# Patient Record
Sex: FEMALE | Race: WHITE | Employment: FULL TIME | URBAN - METROPOLITAN AREA
[De-identification: names, ages, dates, MRNs, and addresses within clinical notes are randomized per-mention and may not be internally consistent; named-entity substitution may affect disease eponyms.]

---

## 2019-08-05 ENCOUNTER — OFFICE VISIT (OUTPATIENT)
Dept: ORTHOPEDIC SURGERY | Age: 59
End: 2019-08-05

## 2019-08-05 VITALS
HEART RATE: 94 BPM | OXYGEN SATURATION: 96 % | DIASTOLIC BLOOD PRESSURE: 77 MMHG | SYSTOLIC BLOOD PRESSURE: 133 MMHG | WEIGHT: 244.6 LBS | HEIGHT: 71 IN | BODY MASS INDEX: 34.24 KG/M2 | TEMPERATURE: 98.7 F | RESPIRATION RATE: 12 BRPM

## 2019-08-05 DIAGNOSIS — M47.812 CERVICAL FACET JOINT SYNDROME: ICD-10-CM

## 2019-08-05 DIAGNOSIS — M54.12 RIGHT CERVICAL RADICULOPATHY: ICD-10-CM

## 2019-08-05 DIAGNOSIS — R29.898 RIGHT ARM WEAKNESS: ICD-10-CM

## 2019-08-05 DIAGNOSIS — M50.20 HNP (HERNIATED NUCLEUS PULPOSUS), CERVICAL: Primary | ICD-10-CM

## 2019-08-05 DIAGNOSIS — M62.838 MUSCLE SPASM: ICD-10-CM

## 2019-08-05 DIAGNOSIS — M48.02 CERVICAL SPINAL STENOSIS: ICD-10-CM

## 2019-08-05 RX ORDER — ACETAMINOPHEN 500 MG
TABLET ORAL
COMMUNITY

## 2019-08-05 RX ORDER — PREDNISONE 10 MG/1
TABLET ORAL
Qty: 32 TAB | Refills: 0 | Status: SHIPPED | OUTPATIENT
Start: 2019-08-05 | End: 2019-08-21

## 2019-08-05 RX ORDER — METHYLPREDNISOLONE 4 MG/1
TABLET ORAL
COMMUNITY
Start: 2019-07-16 | End: 2019-08-05 | Stop reason: ALTCHOICE

## 2019-08-05 RX ORDER — LORATADINE 10 MG/1
1 TABLET ORAL DAILY
COMMUNITY
Start: 2019-06-11

## 2019-08-05 RX ORDER — DICLOFENAC SODIUM 10 MG/G
4 GEL TOPICAL 4 TIMES DAILY
COMMUNITY
Start: 2019-06-11 | End: 2019-08-13

## 2019-08-05 RX ORDER — METRONIDAZOLE 10 MG/G
GEL TOPICAL DAILY
COMMUNITY

## 2019-08-05 RX ORDER — KETOROLAC TROMETHAMINE 15 MG/ML
60 INJECTION, SOLUTION INTRAMUSCULAR; INTRAVENOUS ONCE
Qty: 1 VIAL | Refills: 0
Start: 2019-08-05 | End: 2019-08-05

## 2019-08-05 RX ORDER — GABAPENTIN 100 MG/1
1 CAPSULE ORAL
COMMUNITY
Start: 2019-07-20 | End: 2019-08-05 | Stop reason: SDUPTHER

## 2019-08-05 RX ORDER — GABAPENTIN 300 MG/1
CAPSULE ORAL
Qty: 90 CAP | Refills: 1 | Status: SHIPPED | OUTPATIENT
Start: 2019-08-05 | End: 2019-10-04 | Stop reason: SDUPTHER

## 2019-08-05 RX ORDER — LISINOPRIL 10 MG/1
1 TABLET ORAL DAILY
COMMUNITY
Start: 2019-06-11

## 2019-08-05 RX ORDER — METHOCARBAMOL 500 MG/1
1 TABLET, FILM COATED ORAL
COMMUNITY
Start: 2019-07-16 | End: 2019-08-05 | Stop reason: SDUPTHER

## 2019-08-05 RX ORDER — ERGOCALCIFEROL 1.25 MG/1
1 CAPSULE ORAL
COMMUNITY
Start: 2019-06-29

## 2019-08-05 RX ORDER — METHOCARBAMOL 500 MG/1
TABLET, FILM COATED ORAL
Qty: 90 TAB | Refills: 3 | Status: SHIPPED | OUTPATIENT
Start: 2019-08-05 | End: 2020-02-18 | Stop reason: SDUPTHER

## 2019-08-05 NOTE — PATIENT INSTRUCTIONS
Neck Arthritis: Exercises Introduction Here are some examples of exercises for you to try. The exercises may be suggested for a condition or for rehabilitation. Start each exercise slowly. Ease off the exercises if you start to have pain. You will be told when to start these exercises and which ones will work best for you. How to do the exercises Neck stretches to the side 1. This stretch works best if you keep your shoulder down as you lean away from it. To help you remember to do this, start by relaxing your shoulders and lightly holding on to your thighs or your chair. 2. Tilt your head toward your shoulder and hold for 15 to 30 seconds. Let the weight of your head stretch your muscles. 3. Repeat 2 to 4 times toward each shoulder. Chin tuck 1. Lie on the floor with a rolled-up towel under your neck. Your head should be touching the floor. 2. Slowly bring your chin toward your chest. 
3. Hold for a count of 6, and then relax for up to 10 seconds. 4. Repeat 8 to 12 times. Active cervical rotation 1. Sit in a firm chair, or stand up straight. 2. Keeping your chin level, turn your head to the right, and hold for 15 to 30 seconds. 3. Turn your head to the left and hold for 15 to 30 seconds. 4. Repeat 2 to 4 times to each side. Shoulder blade squeeze 1. While standing, squeeze your shoulder blades together. 2. Do not raise your shoulders up as you are squeezing. 3. Hold for 6 seconds. 4. Repeat 8 to 12 times. Shoulder rolls 1. Sit comfortably with your feet shoulder-width apart. You can also do this exercise standing up. 2. Roll your shoulders up, then back, and then down in a smooth, circular motion. 3. Repeat 2 to 4 times. Follow-up care is a key part of your treatment and safety. Be sure to make and go to all appointments, and call your doctor if you are having problems. It's also a good idea to know your test results and keep a list of the medicines you take. Where can you learn more? Go to http://rachel-omid.info/. Enter Q379 in the search box to learn more about \"Neck Arthritis: Exercises. \" Current as of: September 20, 2018 Content Version: 12.1 © 5043-3427 [employer]. Care instructions adapted under license by [employer] (which disclaims liability or warranty for this information). If you have questions about a medical condition or this instruction, always ask your healthcare professional. [place] [address] any warranty or liability for your use of this information.

## 2019-08-05 NOTE — PROGRESS NOTES
[facility]  [address] [place] [address], [facility] [address]  Phone: [phone]  Fax: [phone]    NEW PATIENT  Pt's YOB: 1960    ASSESSMENT   Diagnoses and all orders for this visit:    1. HNP (herniated nucleus pulposus), cervical  -     REFERRAL TO SPINE SURGERY  -     AMB POC XRAY, SPINE, CERVICAL; 4+ VIE  -     predniSONE (DELTASONE) 10 mg tablet; 6 pills Day 1, 5 pills Day 2, 4 pills Day 3&4, 3 pills Day 5&6, 2 pills Day 7&8, 1 pill Day 9&10, 1/2 pill Day 11&12    2. Right arm weakness  -     REFERRAL TO SPINE SURGERY  -     AMB POC XRAY, SPINE, CERVICAL; 4+ VIE  -     predniSONE (DELTASONE) 10 mg tablet; 6 pills Day 1, 5 pills Day 2, 4 pills Day 3&4, 3 pills Day 5&6, 2 pills Day 7&8, 1 pill Day 9&10, 1/2 pill Day 11&12    3. Right cervical radiculopathy  -     gabapentin (NEURONTIN) 300 mg capsule; Take 1 cap by mouth in the morning and 2 at night as directed. -     REFERRAL TO SPINE SURGERY  -     AMB POC XRAY, SPINE, CERVICAL; 4+ VIE  -     predniSONE (DELTASONE) 10 mg tablet; 6 pills Day 1, 5 pills Day 2, 4 pills Day 3&4, 3 pills Day 5&6, 2 pills Day 7&8, 1 pill Day 9&10, 1/2 pill Day 11&12    4. Cervical facet joint syndrome  -     REFERRAL TO SPINE SURGERY  -     AMB POC XRAY, SPINE, CERVICAL; 4+ VIE  -     predniSONE (DELTASONE) 10 mg tablet; 6 pills Day 1, 5 pills Day 2, 4 pills Day 3&4, 3 pills Day 5&6, 2 pills Day 7&8, 1 pill Day 9&10, 1/2 pill Day 11&12    5. Muscle spasm  -     methocarbamol (ROBAXIN) 500 mg tablet; Take 1 tab by mouth BID-TID as needed for muscle spasms  Indications: muscle spasm  -     REFERRAL TO SPINE SURGERY  -     AMB POC XRAY, SPINE, CERVICAL; 4+ VIE    6. Cervical spinal stenosis  -     REFERRAL TO SPINE SURGERY         IMPRESSION AND PLAN:  Que Pereyra is a 63 y.o. right hand dominant female with history of neck pain since 03/2019.  She complains of pain in the neck that radiates down the right arm to the wrist. Pt also reports weakness in the right arm and admits to dropping objects. She is attending physical therapy with traction, takes Robaxin 500 mg 1-3 tabs a day with relief, but notes minimal improvement with Neurontin 100 mg 1 tab QHS. 1) Pt was given information on cervical arthritis exercises. 2) She received a refill of Robaxin 500 mg 1 tab BID-TID prn muscle spasm  3) Pt will increase her Neurontin 100 mg to 300 mg 1 tab QAM and 2 tabs QHS tapering up as directed. 4) She was referred to Dr. Randall Corrigan for surgical evaluation. 5) Ms. Pat Newton has a reminder for a \"due or due soon\" health maintenance. I have asked that she contact her primary care provider, Nazia Rolle MD, for follow-up on this health maintenance. 6)  demonstrated consistency with prescribing. 7) Pt offered Toradol injection for pain and initially accepted -- she then declined stating that her PCP told her to avoid NSAIDs after viewing recent labs (none, however, are available for review)  Follow-up and Dispositions    · Return in about 2 weeks (around 8/19/2019), or surgical evaluation with Dr Randall Corrigan, for Medication follow up. HISTORY OF PRESENT ILLNESS:  Miky Michael is a 63 y.o. right hand dominant female with history of neck pain since 03/2019. Pt presents to the office today as a new patient. She complains of pain in the neck that radiates down the right arm to the wrist. Pt reports that her pain originally started as a twinge in the right biceps in 03/2019 and then progressively worsened. Pt reports a history of similar symptoms 4 years ago but this improved with physical therapy. She takes Robaxin 500 mg intermittently as needed and notes that her symptoms used to improve within 1-2 days of taking the medication. Pt states that her symptoms are now constant. She notes weakness in the right arm and admits to dropping objects. Pt denies any left sided symptoms at this time.  She reports that her numbness/tingling in the right arm has improved since attending physical therapy with traction. Pt is currently enrolled in physical therapy. Pt states she continued to work and sits at the computer for about half of her work day. Pt states she has been using Robaxin 500 mg 1-3 tabs a day with relief. She also takes Neurontin 100 mg 1 tab QHS without improvement. Pt recently took prednisone as prescribed through [facility]. Pt at this time desires to proceed with medication evaluation and was referred to Dr. Merly Koehler for surgical evaluation. Of note, she is a smoker.      Pain Scale: 4/10     PCP: Kong Ochoa MD    Past Medical History:   Diagnosis Date    Hypertension         Social History     Socioeconomic History    Marital status:      Spouse name: Not on file    Number of children: Not on file    Years of education: Not on file    Highest education level: Not on file   Occupational History    Occupation: [employer]     Employer: OTHER     Comment: at [address] [facility] [employer] resource strain: Not on file    Food insecurity:     Worry: Not on file     Inability: Not on file    Transportation needs:     Medical: Not on file     Non-medical: Not on file   Tobacco Use    Smoking status: Light Tobacco Smoker     Packs/day: 0.25    Smokeless tobacco: Never Used   Substance and Sexual Activity    Alcohol use: Not Currently    Drug use: Not on file    Sexual activity: Not on file   Lifestyle    Physical activity:     Days per week: Not on file     Minutes per session: Not on file    Stress: Not on file   Relationships    Social connections:     Talks on phone: Not on file     Gets together: Not on file     Attends Christian service: Not on file     Active member of club or organization: Not on file     Attends meetings of clubs or organizations: Not on file     Relationship status: Not on file    Intimate partner violence:     Fear of current or ex partner: Not on file     Emotionally abused: Not on file     Physically abused: Not on file     Forced sexual activity: Not on file   Other Topics Concern     Service Not Asked    Blood Transfusions Not Asked    Caffeine Concern Not Asked    Occupational Exposure Not Asked    Hobby Hazards Not Asked    Sleep Concern Not Asked    Stress Concern Not Asked    Weight Concern Not Asked    Special Diet Not Asked    Back Care Not Asked    Exercise Not Asked    Bike Helmet Not Asked   [address] Not Asked    Self-Exams Not Asked   Social History Narrative    Not on file           Allergies   Allergen Reactions    Codeine Anaphylaxis    Cranberry Anaphylaxis    Morphine Anaphylaxis    Nsaids (Non-Steroidal Anti-Inflammatory Drug) Other (comments)     Gastric Bypass Surgery in the Past    Strawberry Anaphylaxis       REVIEW OF SYSTEMS    Constitutional: Negative for fever, chills, or weight change. Respiratory: Negative for cough or shortness of breath. Cardiovascular: Negative for chest pain or palpitations. Gastrointestinal: Negative for acid reflux, change in bowel habits, or constipation. Genitourinary: Negative for dysuria and flank pain. Musculoskeletal: Positive for cervical and right arm pain. Skin: Negative for rash. Neurological: Negative for headaches, dizziness, or numbness. Endo/Heme/Allergies: Negative for increased bruising. Psychiatric/Behavioral: Negative for difficulty with sleep. PHYSICAL EXAMINATION  Visit Vitals  /77   Pulse 94   Temp 98.7 °F (37.1 °C) (Oral)   Resp 12   Ht 5' 11\" (1.803 m)   Wt 244 lb 9.6 oz (110.9 kg)   SpO2 96%   BMI 34.11 kg/m²       Constitutional: Awake, alert, and in no acute distress. HEENT: Normocephalic. Atraumatic. Oropharynx is moist and clear. PERRL. EOMI. Sclerae are nonicteric  Cardiovascular: Regular rate and rhythm  Lungs: Clear to auscultation bilaterally  Abdomen: Soft and nontender.  Bowel sounds are present  Neurological: 1+ symmetrical DTRs in the upper extremities. 1+ symmetrical DTRs in the lower extremities. Sensation to light touch is intact. Negative Nixon's sign bilaterally. Skin: warm, dry, and intact. Musculoskeletal: Decreased range of motion with side to side cervical flexion. Negative Melani's test bilaterally. Positive Bakody's sign. No pain with extension, axial loading, or forward flexion. No pain with internal or external rotation of her hips. Negative straight leg raise bilaterally. Pt had her right arm elevated on a pillow throughout her office visit today. Biceps  Triceps Deltoids Wrist Ext Wrist Flex Hand Intrin   Right -4/5 -4/5 -4/5 -4/5 -4/5 -4/5   Left +4/5 +4/5 +4/5 +4/5 +4/5 +4/5      Hip Flex  Quads Hamstrings Ankle DF EHL Ankle PF   Right +4/5 +4/5 +4/5 +4/5 +4/5 +4/5   Left +4/5 +4/5 +4/5 +4/5 +4/5 +4/5     IMAGING:    Cervical spine x-rays from 08/05/2019 were personally reviewed with the patient and demonstrated:  Reversal of cervical curvature. Degenerative disc at C3, C4, C5, C6, and C7. Degenerative facets. Cervical spine MRI from 6/24/2019 was personally reviewed with the patient and demonstrated:  IMPRESSION:  Multilevel degenerative changes of the cervical spine as described. Recommended correlation with site and side of patients symptoms to determine significance of imaging findings    Cervical spine MRI from 6/24/2015 was personally reviewed with the patient and demonstrated:  IMPRESSION:  An overall moderate multilevel, multifactorial degenerative changes of the cervical spine with ventral cervical spinal cord contact multiple levels with severe foraminal narrowing on the right at C5/6. Written by Gunjan Mijares, as dictated by Marv Gallo MD.  I, Dr. Marv Gallo confirm that all documentation is accurate.

## 2019-08-05 NOTE — Clinical Note
8/5/19 Patient: Mari Parikh YOB: 1960 Date of Visit: 8/5/2019 [address] [place] [address] V[address] Dear Marbella Arthur MD, Thank you for referring Ms. Mari Parikh to [facility] for evaluation. My notes for this consultation are attached. If you have questions, please do not hesitate to call me. I look forward to following your patient along with you. Sincerely, Dary Reid MD

## 2019-08-07 ENCOUNTER — OFFICE VISIT (OUTPATIENT)
Dept: ORTHOPEDIC SURGERY | Age: 59
End: 2019-08-07

## 2019-08-07 VITALS
OXYGEN SATURATION: 93 % | RESPIRATION RATE: 18 BRPM | WEIGHT: 245 LBS | DIASTOLIC BLOOD PRESSURE: 88 MMHG | SYSTOLIC BLOOD PRESSURE: 133 MMHG | HEART RATE: 98 BPM | HEIGHT: 71 IN | TEMPERATURE: 98.6 F | BODY MASS INDEX: 34.3 KG/M2

## 2019-08-07 DIAGNOSIS — M50.20 HNP (HERNIATED NUCLEUS PULPOSUS), CERVICAL: Primary | ICD-10-CM

## 2019-08-07 NOTE — PROGRESS NOTES
[address] [facility]   Pre-Surgical Worksheet    Patient: Delbert Gunn                         MRN: [id number]     Age:  63 y.o.,      Sex: female    YOB: 1960           JUANA: August 7, 2019  PCP: Haley Mcghee MD    Allergies   Allergen Reactions    Codeine Anaphylaxis    Cranberry Anaphylaxis    Morphine Anaphylaxis    Nsaids (Non-Steroidal Anti-Inflammatory Drug) Other (comments)     Gastric Bypass Surgery in the Past    Strawberry Anaphylaxis         ICD-10-CM ICD-9-CM    1. HNP (herniated nucleus pulposus), cervical M50.20 722.0        Surgery: ACDF C5/6. Pain Assessment   Pain Assessment  8/7/2019   Location of Pain Arm;Neck   Location Modifiers Right   Severity of Pain 4   Quality of Pain Throbbing;Aching   Duration of Pain Persistent   Frequency of Pain Constant   Aggravating Factors Bending;Stretching;Straightening   Limiting Behavior Some   Relieving Factors Elevation   Result of Injury No       Visit Vitals  /88   Pulse 98   Temp 98.6 °F (37 °C)   Resp 18   Ht 5' 11\" (1.803 m)   Wt 245 lb (111.1 kg)   SpO2 93%   BMI 34.17 kg/m²       ADL Limits: Patient carries pillow around to prop her right arm due to the pain. She has to have help bathing, and getting dressed due to the inability to lift her right arm without causing excruciating pain    Spine Surgery?: No:  When ? [facility] Where? .    Spinal Injections?: No:   When ? [facility] Where? .    Physical Therapy?: Yes  When 2019. Where [address] [place] [address]. NSAID's?: NO    Pain Medications?: No:   Type: ? [facility] In Pain Management: NO, Where: ?    Current Outpatient Medications   Medication Sig    VOLTAREN 1 % gel Apply 4 g to affected area four (4) times daily.  ergocalciferol (ERGOCALCIFEROL) 50,000 unit capsule Take 1 Cap by mouth every seven (7) days.  lisinopril (PRINIVIL, ZESTRIL) 10 mg tablet Take 1 Tab by mouth daily.  loratadine (CLARITIN) 10 mg tablet Take 1 Tab by mouth daily.     metroNIDAZOLE (METROGEL) 1 % topical gel Apply  to affected area daily. Use a thin layer to affected areas after washing    acetaminophen (TYLENOL EXTRA STRENGTH) 500 mg tablet Take  by mouth every six (6) hours as needed for Pain.  methocarbamol (ROBAXIN) 500 mg tablet Take 1 tab by mouth BID-TID as needed for muscle spasms  Indications: muscle spasm    gabapentin (NEURONTIN) 300 mg capsule Take 1 cap by mouth in the morning and 2 at night as directed.  predniSONE (DELTASONE) 10 mg tablet 6 pills Day 1, 5 pills Day 2, 4 pills Day 3&4, 3 pills Day 5&6, 2 pills Day 7&8, 1 pill Day 9&10, 1/2 pill Day 11&12     No current facility-administered medications for this visit. Past Medical History:   Diagnosis Date    Hypertension        Past Surgical History:   Procedure Laterality Date    HX BREAST REDUCTION Bilateral     HX  SECTION      HX  SECTION      HX CHOLECYSTECTOMY      HX GASTRIC BYPASS      HX GYN  1993    uterus and cervix removed    HX OOPHORECTOMY      HX ORTHOPAEDIC Right 2000    \"wrist surgically fixed\" per patient    [address]    \"Most of Thyroid removed\" per patient.     HX TONSILLECTOMY  1965       Social History     Socioeconomic History    Marital status:      Spouse name: Not on file    Number of children: Not on file    Years of education: Not on file    Highest education level: Not on file   Occupational History    Occupation: [employer]     Employer: OTHER     Comment: at [address] [facility], [address] Use    Smoking status: Light Tobacco Smoker     Packs/day: 0.25    Smokeless tobacco: Never Used   Substance and Sexual Activity    Alcohol use: Not Currently   Other Topics Concern

## 2019-08-07 NOTE — PROGRESS NOTES
[address]  [address], [address]  [address]  Phone: [phone]  Fax: [phone]  INITIAL CONSULTATION  Patient: Gabby Michelle                MRN: [id number]       SSN: [id number]  YOB: 1960        AGE: 63 y.o. SEX: female  Body mass index is 34.17 kg/m². PCP: Jeanmarie Licona MD  08/07/19    Chief Complaint   Patient presents with    Neck Pain     neck pain    Arm Pain     right arm pain         HISTORY OF PRESENT ILLNESS, RADIOGRAPHS, and PLAN:         HISTORY OF PRESENT ILLNESS:  Ms. Lyn Latif is seen today at the request of Dr. Slater Schaumann and Dr. Williamson. Ms. Lyn Latif is a 71-year-old female who works in [employer]. She has a history of hypertension and vitamin D deficiency. About five or six months ago, she had an onset of severe neck and radiating right arm pain in a classic C6 distribution in her neck, biceps, and to her thumb. It steadily worsens despite physical therapy, medications, and time. She has to hold her arm with a pillow, and she is constantly posturing with it. She has a hard time working or functioning. She feels weak and numb in the arm. She denies bowel or bladder dysfunction, fever, chills, or night sweats, or gait instability. RADIOGRAPHS:  X-rays demonstrate diffuse degenerative changes and loss of cervical lordosis. MRI of the cervical spine demonstrates diffuse degenerative changes, but at C5-6 there is a right paracentral to foraminal extrusion causing severe foraminal lateral recess stenosis. ASSESSMENT/PLAN: I discussed the matter at length with her. Given the severity of her pain and neurologic deficit, its duration, and failure of conservative measures, my recommendation would be a cervical decompression and fusion at C5-6. I considered cervical disc arthroplasty, but the degenerative changes in the segment are just simply too great.   There is not enough disc space height to tolerate a disc replacement. I discussed the risks, benefits, complications, and alternatives to surgery. The patient wishes to proceed. We will proceed with a cervical discectomy and fusion was the appropriate approvals and clearances take place. cc: Ganga Cormier M.D. Past Medical History:   Diagnosis Date    Hypertension        Family History   Problem Relation Age of Onset    Osteoporosis Mother     Heart Disease Mother     Arrhythmia Mother     Hypertension Mother     Thyroid Disease Father     Stroke Father     Other Father         Rheumatic Fever    Hypertension Father        Current Outpatient Medications   Medication Sig Dispense Refill    VOLTAREN 1 % gel Apply 4 g to affected area four (4) times daily.  ergocalciferol (ERGOCALCIFEROL) 50,000 unit capsule Take 1 Cap by mouth every seven (7) days.  lisinopril (PRINIVIL, ZESTRIL) 10 mg tablet Take 1 Tab by mouth daily.  loratadine (CLARITIN) 10 mg tablet Take 1 Tab by mouth daily.  metroNIDAZOLE (METROGEL) 1 % topical gel Apply  to affected area daily. Use a thin layer to affected areas after washing      acetaminophen (TYLENOL EXTRA STRENGTH) 500 mg tablet Take  by mouth every six (6) hours as needed for Pain.  methocarbamol (ROBAXIN) 500 mg tablet Take 1 tab by mouth BID-TID as needed for muscle spasms  Indications: muscle spasm 90 Tab 3    gabapentin (NEURONTIN) 300 mg capsule Take 1 cap by mouth in the morning and 2 at night as directed.  90 Cap 1    predniSONE (DELTASONE) 10 mg tablet 6 pills Day 1, 5 pills Day 2, 4 pills Day 3&4, 3 pills Day 5&6, 2 pills Day 7&8, 1 pill Day 9&10, 1/2 pill Day 11&12 32 Tab 0       Allergies   Allergen Reactions    Codeine Anaphylaxis    Cranberry Anaphylaxis    Morphine Anaphylaxis    Nsaids (Non-Steroidal Anti-Inflammatory Drug) Other (comments)     Gastric Bypass Surgery in the Past    Strawberry Anaphylaxis       Past Surgical History:   Procedure Laterality Date    HX BREAST REDUCTION Bilateral 1994    HX  SECTION  1989    HX  SECTION  1993    HX CHOLECYSTECTOMY  1991    HX GASTRIC BYPASS  2002    HX GYN  1993    uterus and cervix removed    HX OOPHORECTOMY  2006    HX ORTHOPAEDIC Right 2000    \"wrist surgically fixed\" per patient    [address]    \"Most of Thyroid removed\" per patient.     HX TONSILLECTOMY  1965       Past Medical History:   Diagnosis Date    Hypertension        Social History     Socioeconomic History    Marital status:      Spouse name: Not on file    Number of children: Not on file    Years of education: Not on file    Highest education level: Not on file   Occupational History    Occupation: [employer]     Employer: OTHER     Comment: at [address] [facility] resource strain: Not on file    Food insecurity:     Worry: Not on file     Inability: Not on file    Transportation needs:     Medical: Not on file     Non-medical: Not on file   Tobacco Use    Smoking status: Light Tobacco Smoker     Packs/day: 0.25    Smokeless tobacco: Never Used   Substance and Sexual Activity    Alcohol use: Not Currently    Drug use: Not on file    Sexual activity: Not on file   Lifestyle    Physical activity:     Days per week: Not on file     Minutes per session: Not on file    Stress: Not on file   Relationships    Social connections:     Talks on phone: Not on file     Gets together: Not on file     Attends Pent[employer]al service: Not on file     Active member of club or organization: Not on file     Attends meetings of clubs or organizations: Not on file     Relationship status: Not on file    Intimate partner violence:     Fear of current or ex partner: Not on file     Emotionally abused: Not on file     Physically abused: Not on file     Forced sexual activity: Not on file   Other Topics Concern   [address] Service Not Asked    Blood Transfusions Not Asked    Caffeine Concern Not Asked    Occupational Exposure Not Asked   [place] Hazards Not Asked    Sleep Concern Not Asked    Stress Concern Not Asked    Weight Concern Not Asked    Special Diet Not Asked    Back Care Not Asked    Exercise Not Asked    Bike Helmet Not Asked   [address] Not Asked    Self-Exams Not Asked   Social History Narrative    Not on file           REVIEW OF SYSTEMS:   CONSTITUTIONAL SYMPTOMS:  Negative. EYES:  Negative. EARS, NOSE, THROAT AND MOUTH:  Negative. CARDIOVASCULAR:  Negative. RESPIRATORY:  Negative. GENITOURINARY: Per HPI. GASTROINTESTINAL:  Per HPI. INTEGUMENTARY (SKIN AND/OR BREAST):  Negative. MUSCULOSKELETAL: Per HPI.   ENDOCRINE/RHEUMATOLOGIC:  Negative. NEUROLOGICAL:  Per HPI. HEMATOLOGIC/LYMPHATIC:  Negative. ALLERGIC/IMMUNOLOGIC:  Negative. PSYCHIATRIC:  Negative. PHYSICAL EXAMINATION:   Visit Vitals  /88   Pulse 98   Temp 98.6 °F (37 °C)   Resp 18   Ht 5' 11\" (1.803 m)   Wt 245 lb (111.1 kg)   SpO2 93%   BMI 34.17 kg/m²    PAIN SCALE: 4/10    CONSTITUTIONAL: The patient is in no apparent distress and is alert and oriented x 3. HEENT: Normocephalic. Hearing grossly intact. NECK: Supple and symmetric. no tenderness, or masses were felt. RESPIRATORY: No labored breathing. CARDIOVASCULAR: The carotid pulses were normal. Peripheral pulses were 2+. CHEST: Normal AP diameter and normal contour without any kyphoscoliosis. LYMPHATIC: No lymphadenopathy was appreciated in the neck, axillae or groin. SKIN:  Negative for scars, rashes, lesions, or ulcers on the right upper, right lower, left upper, left lower and trunk. NEUROLOGICAL: Alert and oriented x 3. Ambulation without assistive device. FWB. EXTREMITIES:  See musculoskeletal.  MUSCULOSKELETAL:   Head and Neck: Neck pain radiating into RUE. Negative for misalignment, asymmetry, crepitation, defects, tenderness masses or effusions.     Left Upper Extremity: Inspection, percussion and palpation performed. Nixons sign is negative.  Right Upper Extremity: Pain. Inspection, percussion and palpation performed. Nixons sign is negative.  Spine, Ribs and Pelvis: Inspection, percussion and palpation performed. Negative for misalignment, asymmetry, crepitation, defects, tenderness masses or effusions.  Left Lower Extremity: Inspection, percussion and palpation performed. Negative straight leg raise.  Right Lower Extremity: Inspection, percussion and palpation performed. Negative straight leg raise. SPINE EXAM:     Cervical spine: Neck is midline. Normal muscle tone. No focal atrophy is noted. Lumbar spine: No rash, ecchymosis, or gross obliquity. No focal atrophy is noted. ASSESSMENT    ICD-10-CM ICD-9-CM    1. HNP (herniated nucleus pulposus), cervical M50.20 722.0        Written by Anna Taveras, as dictated by Frances Magaña MD.    I, Dr. Frances Magaña MD, confirm that all documentation is accurate.

## 2019-08-08 ENCOUNTER — TELEPHONE (OUTPATIENT)
Dept: ORTHOPEDIC SURGERY | Age: 59
End: 2019-08-08

## 2019-08-08 DIAGNOSIS — Z01.818 PRE-OP EVALUATION: Primary | ICD-10-CM

## 2019-08-09 ENCOUNTER — HOSPITAL ENCOUNTER (OUTPATIENT)
Dept: PREADMISSION TESTING | Age: 59
Discharge: HOME | End: 2019-08-09
Payer: OTHER GOVERNMENT

## 2019-08-09 DIAGNOSIS — Z01.818 PRE-OP EVALUATION: ICD-10-CM

## 2019-08-09 LAB
ALBUMIN SERPL-MCNC: 3.5 G/DL (ref 3.4–5)
ALBUMIN/GLOB SERPL: 0.9 {RATIO} (ref 0.8–1.7)
ALP SERPL-CCNC: 138 U/L (ref 45–117)
ALT SERPL-CCNC: 20 U/L (ref 13–56)
ANION GAP SERPL CALC-SCNC: 4 MMOL/L (ref 3–18)
AST SERPL-CCNC: 15 U/L (ref 10–38)
BILIRUB SERPL-MCNC: 0.2 MG/DL (ref 0.2–1)
BUN SERPL-MCNC: 19 MG/DL (ref 7–18)
BUN/CREAT SERPL: 16 (ref 12–20)
CALCIUM SERPL-MCNC: 8.6 MG/DL (ref 8.5–10.1)
CHLORIDE SERPL-SCNC: 109 MMOL/L (ref 100–111)
CO2 SERPL-SCNC: 30 MMOL/L (ref 21–32)
CREAT SERPL-MCNC: 1.16 MG/DL (ref 0.6–1.3)
ERYTHROCYTE [DISTWIDTH] IN BLOOD BY AUTOMATED COUNT: 13.2 % (ref 11.6–14.5)
GLOBULIN SER CALC-MCNC: 3.7 G/DL (ref 2–4)
GLUCOSE SERPL-MCNC: 92 MG/DL (ref 74–99)
HCT VFR BLD AUTO: 40.6 % (ref 35–45)
HGB BLD-MCNC: 12.9 G/DL (ref 12–16)
MCH RBC QN AUTO: 28.7 PG (ref 24–34)
MCHC RBC AUTO-ENTMCNC: 31.8 G/DL (ref 31–37)
MCV RBC AUTO: 90.4 FL (ref 74–97)
PLATELET # BLD AUTO: 315 K/UL (ref 135–420)
PMV BLD AUTO: 11.2 FL (ref 9.2–11.8)
POTASSIUM SERPL-SCNC: 4.3 MMOL/L (ref 3.5–5.5)
PROT SERPL-MCNC: 7.2 G/DL (ref 6.4–8.2)
RBC # BLD AUTO: 4.49 M/UL (ref 4.2–5.3)
SODIUM SERPL-SCNC: 143 MMOL/L (ref 136–145)
WBC # BLD AUTO: 10.6 K/UL (ref 4.6–13.2)

## 2019-08-09 PROCEDURE — 85027 COMPLETE CBC AUTOMATED: CPT

## 2019-08-09 PROCEDURE — 93005 ELECTROCARDIOGRAM TRACING: CPT

## 2019-08-09 PROCEDURE — 80053 COMPREHEN METABOLIC PANEL: CPT

## 2019-08-09 PROCEDURE — 36415 COLL VENOUS BLD VENIPUNCTURE: CPT

## 2019-08-10 LAB
ATRIAL RATE: 65 BPM
CALCULATED P AXIS, ECG09: 45 DEGREES
CALCULATED R AXIS, ECG10: 34 DEGREES
CALCULATED T AXIS, ECG11: 39 DEGREES
DIAGNOSIS, 93000: NORMAL
P-R INTERVAL, ECG05: 146 MS
Q-T INTERVAL, ECG07: 438 MS
QRS DURATION, ECG06: 88 MS
QTC CALCULATION (BEZET), ECG08: 455 MS
VENTRICULAR RATE, ECG03: 65 BPM

## 2019-08-13 ENCOUNTER — DOCUMENTATION ONLY (OUTPATIENT)
Dept: ORTHOPEDIC SURGERY | Age: 59
End: 2019-08-13

## 2019-08-19 NOTE — H&P
Pre-Admission History and Physical    Patient: Clara Lopez   MRN: [id number]   SSN: [id number]   YOB: 1960   Age: 63 y.o. Sex: female     Patient scheduled for: ACDF C5/6. Date of surgery: 8/21/19. [facility] Surgeon: Joleen Burnett MD    HPI:  Clara Lopez is a 63 y.o. female who works in [employer]. She has a history of hypertension and vitamin D deficiency. About five or six months ago, she had an onset of severe neck and radiating right arm pain in a classic C6 distribution in her neck, biceps, and to her thumb. It steadily worsens despite physical therapy, medications, and time. She has to hold her arm with a pillow, and she is constantly posturing with it. She has a hard time working or functioning. She feels weak and numb in the arm. She reports a pain level of at least 4/10. MRI demonstrates diffuse degenerative changes, but at C5-6 there is a right paracentral to foraminal extrusion causing severe foraminal lateral recess stenosis. Pain has impacted the patient's functional ability to use her arm and perform ADLs and she is being admitted for surgical intervention.          Past Medical History:   Diagnosis Date    Hypertension     Ill-defined condition 2008    sub arachnoid hemorrhage     Social History     Socioeconomic History    Marital status:      Spouse name: Not on file    Number of children: Not on file    Years of education: Not on file    Highest education level: Not on file   Occupational History    Occupation: [employer]     Employer: OTHER     Comment: at [address] [facility],  [address] Use    Smoking status: Light Tobacco Smoker     Packs/day: 0.25    Smokeless tobacco: Never Used   Substance and Sexual Activity    Alcohol use: Not Currently    Drug use: Never   Other Topics Concern     Past Surgical History:   Procedure Laterality Date    HX BREAST REDUCTION Bilateral 1994  [facility]  HX  SECTION      HX CHOLECYSTECTOMY      HX GASTRIC BYPASS  2002    HX GYN  1993    uterus and cervix removed    HX OOPHORECTOMY  2006    HX ORTHOPAEDIC Right 2000    \"wrist surgically fixed\" per patient    [address]    \"Most of Thyroid removed\" per patient.  HX TONSILLECTOMY  1965     Family History   Problem Relation Age of Onset    Osteoporosis Mother     Heart Disease Mother     Arrhythmia Mother     Hypertension Mother     Thyroid Disease Father     Stroke Father     Other Father         Rheumatic Fever    Hypertension Father      Allergies   Allergen Reactions    Codeine Anaphylaxis    Cranberry Anaphylaxis    Morphine Anaphylaxis    Nsaids (Non-Steroidal Anti-Inflammatory Drug) Other (comments)     Gastric Bypass Surgery in the Past    Strawberry Anaphylaxis     Current Outpatient Medications   Medication Sig Dispense Refill    ergocalciferol (ERGOCALCIFEROL) 50,000 unit capsule Take 1 Cap by mouth every seven (7) days.  lisinopril (PRINIVIL, ZESTRIL) 10 mg tablet Take 1 Tab by mouth daily.  loratadine (CLARITIN) 10 mg tablet Take 1 Tab by mouth daily.  metroNIDAZOLE (METROGEL) 1 % topical gel Apply  to affected area daily. Use a thin layer to affected areas after washing      methocarbamol (ROBAXIN) 500 mg tablet Take 1 tab by mouth BID-TID as needed for muscle spasms  Indications: muscle spasm 90 Tab 3    gabapentin (NEURONTIN) 300 mg capsule Take 1 cap by mouth in the morning and 2 at night as directed. 90 Cap 1    predniSONE (DELTASONE) 10 mg tablet 6 pills Day 1, 5 pills Day 2, 4 pills Day 3&4, 3 pills Day 5&6, 2 pills Day 7&8, 1 pill Day 9&10, 1/2 pill Day 11&12 32 Tab 0    acetaminophen (TYLENOL EXTRA STRENGTH) 500 mg tablet Take  by mouth every six (6) hours as needed for Pain.          ROS:  Denies chills, fever,night sweats,  bowel or bladder dysfunction, unexplained weight loss/weight gain, chest pain, sob or anxiety. Physical Examination    Gen: Well developed, well nourished 63 y.o. female Visit Vitals  /88   Pulse 98   Temp 98.6 °F (37 °C)   Resp 18   Ht 5' 11\" (1.803 m)   Wt 245 lb (111.1 kg)   SpO2 93%   BMI 34.17 kg/m²    PAIN SCALE: 4/10     CONSTITUTIONAL: The patient is in no apparent distress and is alert and oriented x 3. HEENT: Normocephalic. Hearing grossly intact. NECK: Supple and symmetric. no tenderness, or masses were felt. RESPIRATORY: No labored breathing. CARDIOVASCULAR: The carotid pulses were normal. Peripheral pulses were 2+. CHEST: Normal AP diameter and normal contour without any kyphoscoliosis. LYMPHATIC: No lymphadenopathy was appreciated in the neck, axillae or groin. SKIN:  Negative for scars, rashes, lesions, or ulcers on the right upper, right lower, left upper, left lower and trunk. NEUROLOGICAL: Alert and oriented x 3. Ambulation without assistive device. FWB. EXTREMITIES:  See musculoskeletal.  MUSCULOSKELETAL:  · Head and Neck: Neck pain radiating into RUE. Negative for misalignment, asymmetry, crepitation, defects, tenderness masses or effusions. · Left Upper Extremity: Inspection, percussion and palpation performed. Nixons sign is negative. · Right Upper Extremity: Pain. Inspection, percussion and palpation performed. Nixons sign is negative. · Spine, Ribs and Pelvis: Inspection, percussion and palpation performed. Negative for misalignment, asymmetry, crepitation, defects, tenderness masses or effusions. · Left Lower Extremity: Inspection, percussion and palpation performed. Negative straight leg raise. · Right Lower Extremity: Inspection, percussion and palpation performed. Negative straight leg raise.        SPINE EXAM:      Cervical spine: Neck is midline. Normal muscle tone. No focal atrophy is noted.     Lumbar spine: No rash, ecchymosis, or gross obliquity. No focal atrophy is noted.      Assessment and Plan    Due to the pt's persistent symptoms unrelieved by conservative measure Sylvain Romero is being admitted [facility] to undergo surgical intervention. The post-operative plan of care consists of physical therapy, home health and a 2 week f/u office visit. We are pending medical clearance by Dr. Juana Quintanilla. The risks, benefits, complications and alternatives to surgery have been discussed in detail with the patient. The patient understands and agrees to proceed.      Lindy Nava NP-BC dictating for Irma Bowers MD

## 2019-08-20 ENCOUNTER — DOCUMENTATION ONLY (OUTPATIENT)
Dept: ORTHOPEDIC SURGERY | Age: 59
End: 2019-08-20

## 2019-08-20 ENCOUNTER — ANESTHESIA EVENT (OUTPATIENT)
Dept: SURGERY | Age: 59
End: 2019-08-20
Payer: OTHER GOVERNMENT

## 2019-08-20 NOTE — PROGRESS NOTES
Completed FMLA forms, Dr Elias Burgess reviewed and signed, given to [facility] for processing

## 2019-08-21 ENCOUNTER — HOME HEALTH ADMISSION (OUTPATIENT)
Dept: HOME HEALTH SERVICES | Facility: HOME HEALTH | Age: 59
End: 2019-08-21
Payer: OTHER GOVERNMENT

## 2019-08-21 ENCOUNTER — HOSPITAL ENCOUNTER (OUTPATIENT)
Age: 59
Setting detail: OBSERVATION
Discharge: HOME | End: 2019-08-21
Attending: ORTHOPAEDIC SURGERY | Admitting: ORTHOPAEDIC SURGERY
Payer: OTHER GOVERNMENT

## 2019-08-21 ENCOUNTER — APPOINTMENT (OUTPATIENT)
Dept: GENERAL RADIOLOGY | Age: 59
End: 2019-08-21
Attending: ORTHOPAEDIC SURGERY
Payer: OTHER GOVERNMENT

## 2019-08-21 ENCOUNTER — ANESTHESIA (OUTPATIENT)
Dept: SURGERY | Age: 59
End: 2019-08-21
Payer: OTHER GOVERNMENT

## 2019-08-21 VITALS
TEMPERATURE: 97.8 F | HEART RATE: 82 BPM | WEIGHT: 238 LBS | SYSTOLIC BLOOD PRESSURE: 138 MMHG | RESPIRATION RATE: 17 BRPM | HEIGHT: 71 IN | DIASTOLIC BLOOD PRESSURE: 78 MMHG | OXYGEN SATURATION: 95 % | BODY MASS INDEX: 33.32 KG/M2

## 2019-08-21 DIAGNOSIS — M50.20 HNP (HERNIATED NUCLEUS PULPOSUS), CERVICAL: Primary | ICD-10-CM

## 2019-08-21 PROCEDURE — 77030029099 HC BN WAX SSPC -A: Performed by: ORTHOPAEDIC SURGERY

## 2019-08-21 PROCEDURE — 74011000250 HC RX REV CODE- 250

## 2019-08-21 PROCEDURE — C1713 ANCHOR/SCREW BN/BN,TIS/BN: HCPCS | Performed by: ORTHOPAEDIC SURGERY

## 2019-08-21 PROCEDURE — 77030019605: Performed by: ORTHOPAEDIC SURGERY

## 2019-08-21 PROCEDURE — 76210000016 HC OR PH I REC 1 TO 1.5 HR: Performed by: ORTHOPAEDIC SURGERY

## 2019-08-21 PROCEDURE — 74011250636 HC RX REV CODE- 250/636

## 2019-08-21 PROCEDURE — 74011250636 HC RX REV CODE- 250/636: Performed by: NURSE ANESTHETIST, CERTIFIED REGISTERED

## 2019-08-21 PROCEDURE — 76010000153 HC OR TIME 1.5 TO 2 HR: Performed by: ORTHOPAEDIC SURGERY

## 2019-08-21 PROCEDURE — 99218 HC RM OBSERVATION: CPT

## 2019-08-21 PROCEDURE — 76060000034 HC ANESTHESIA 1.5 TO 2 HR: Performed by: ORTHOPAEDIC SURGERY

## 2019-08-21 PROCEDURE — 77030031139 HC SUT VCRL2 J&J -A: Performed by: ORTHOPAEDIC SURGERY

## 2019-08-21 PROCEDURE — L0120 CERV FLEX N/ADJ FOAM PRE OTS: HCPCS | Performed by: ORTHOPAEDIC SURGERY

## 2019-08-21 PROCEDURE — 74011250636 HC RX REV CODE- 250/636: Performed by: NURSE PRACTITIONER

## 2019-08-21 PROCEDURE — 77030004402 HC BUR NEUR STRY -C: Performed by: ORTHOPAEDIC SURGERY

## 2019-08-21 PROCEDURE — 77030034475 HC MISC IMPL SPN: Performed by: ORTHOPAEDIC SURGERY

## 2019-08-21 PROCEDURE — 77030039266 HC ADH SKN EXOFIN S2SG -A: Performed by: ORTHOPAEDIC SURGERY

## 2019-08-21 PROCEDURE — 74011000272 HC RX REV CODE- 272: Performed by: ORTHOPAEDIC SURGERY

## 2019-08-21 PROCEDURE — 77030011267 HC ELECTRD BLD COVD -A: Performed by: ORTHOPAEDIC SURGERY

## 2019-08-21 PROCEDURE — 77030027138 HC INCENT SPIROMETER -A: Performed by: ORTHOPAEDIC SURGERY

## 2019-08-21 PROCEDURE — 74011250637 HC RX REV CODE- 250/637: Performed by: ANESTHESIOLOGY

## 2019-08-21 PROCEDURE — 97161 PT EVAL LOW COMPLEX 20 MIN: CPT

## 2019-08-21 PROCEDURE — 77030020782 HC GWN BAIR PAWS FLX 3M -B: Performed by: ORTHOPAEDIC SURGERY

## 2019-08-21 PROCEDURE — 77030040361 HC SLV COMPR DVT MDII -B: Performed by: ORTHOPAEDIC SURGERY

## 2019-08-21 PROCEDURE — 74011250636 HC RX REV CODE- 250/636: Performed by: ORTHOPAEDIC SURGERY

## 2019-08-21 PROCEDURE — 74011250637 HC RX REV CODE- 250/637: Performed by: NURSE ANESTHETIST, CERTIFIED REGISTERED

## 2019-08-21 PROCEDURE — 77030027960 HC SPCR CERV VIKOS K2M -G1: Performed by: ORTHOPAEDIC SURGERY

## 2019-08-21 PROCEDURE — 77030013567 HC DRN WND RESERV BARD -A: Performed by: ORTHOPAEDIC SURGERY

## 2019-08-21 PROCEDURE — 74011000250 HC RX REV CODE- 250: Performed by: ORTHOPAEDIC SURGERY

## 2019-08-21 PROCEDURE — 74011250637 HC RX REV CODE- 250/637: Performed by: ORTHOPAEDIC SURGERY

## 2019-08-21 PROCEDURE — 77030012893: Performed by: ORTHOPAEDIC SURGERY

## 2019-08-21 PROCEDURE — 77030018836 HC SOL IRR NACL ICUM -A: Performed by: ORTHOPAEDIC SURGERY

## 2019-08-21 PROCEDURE — 77030012406 HC DRN WND PENRS BARD -A: Performed by: ORTHOPAEDIC SURGERY

## 2019-08-21 PROCEDURE — 77030010514 HC APPL CLP LIG COVD -B: Performed by: ORTHOPAEDIC SURGERY

## 2019-08-21 DEVICE — SCREW SPNL L16MM DIA4MM SELF STARTING VAR ANT CERV TI OZARK: Type: IMPLANTABLE DEVICE | Site: SPINE CERVICAL | Status: FUNCTIONAL

## 2019-08-21 DEVICE — Z DUP USE 2717610 GRAFT SPNL W14XH8XL11MM CERV LORD W PLUG VIKOS: Type: IMPLANTABLE DEVICE | Site: SPINE CERVICAL | Status: FUNCTIONAL

## 2019-08-21 RX ORDER — NALOXONE HYDROCHLORIDE 0.4 MG/ML
0.4 INJECTION, SOLUTION INTRAMUSCULAR; INTRAVENOUS; SUBCUTANEOUS AS NEEDED
Status: DISCONTINUED | OUTPATIENT
Start: 2019-08-21 | End: 2019-08-21 | Stop reason: HOSPADM

## 2019-08-21 RX ORDER — DIPHENHYDRAMINE HYDROCHLORIDE 50 MG/ML
12.5 INJECTION, SOLUTION INTRAMUSCULAR; INTRAVENOUS
Status: DISCONTINUED | OUTPATIENT
Start: 2019-08-21 | End: 2019-08-21 | Stop reason: HOSPADM

## 2019-08-21 RX ORDER — PROPOFOL 10 MG/ML
INJECTION, EMULSION INTRAVENOUS AS NEEDED
Status: DISCONTINUED | OUTPATIENT
Start: 2019-08-21 | End: 2019-08-21 | Stop reason: HOSPADM

## 2019-08-21 RX ORDER — LIDOCAINE HYDROCHLORIDE 20 MG/ML
INJECTION, SOLUTION EPIDURAL; INFILTRATION; INTRACAUDAL; PERINEURAL AS NEEDED
Status: DISCONTINUED | OUTPATIENT
Start: 2019-08-21 | End: 2019-08-21 | Stop reason: HOSPADM

## 2019-08-21 RX ORDER — EPHEDRINE SULFATE/0.9% NACL/PF 25 MG/5 ML
SYRINGE (ML) INTRAVENOUS AS NEEDED
Status: DISCONTINUED | OUTPATIENT
Start: 2019-08-21 | End: 2019-08-21 | Stop reason: HOSPADM

## 2019-08-21 RX ORDER — CEFAZOLIN SODIUM 2 G/50ML
2 SOLUTION INTRAVENOUS ONCE
Status: COMPLETED | OUTPATIENT
Start: 2019-08-21 | End: 2019-08-21

## 2019-08-21 RX ORDER — DEXTROSE, SODIUM CHLORIDE, AND POTASSIUM CHLORIDE 5; .45; .15 G/100ML; G/100ML; G/100ML
25 INJECTION INTRAVENOUS CONTINUOUS
Status: DISCONTINUED | OUTPATIENT
Start: 2019-08-21 | End: 2019-08-21 | Stop reason: HOSPADM

## 2019-08-21 RX ORDER — VASOPRESSIN 20 U/ML
INJECTION PARENTERAL AS NEEDED
Status: DISCONTINUED | OUTPATIENT
Start: 2019-08-21 | End: 2019-08-21 | Stop reason: HOSPADM

## 2019-08-21 RX ORDER — VANCOMYCIN HYDROCHLORIDE 1 G/20ML
INJECTION, POWDER, LYOPHILIZED, FOR SOLUTION INTRAVENOUS AS NEEDED
Status: DISCONTINUED | OUTPATIENT
Start: 2019-08-21 | End: 2019-08-21 | Stop reason: HOSPADM

## 2019-08-21 RX ORDER — LORAZEPAM 2 MG/ML
1 INJECTION INTRAMUSCULAR
Status: DISCONTINUED | OUTPATIENT
Start: 2019-08-21 | End: 2019-08-21 | Stop reason: HOSPADM

## 2019-08-21 RX ORDER — CEFAZOLIN SODIUM 2 G/50ML
2 SOLUTION INTRAVENOUS EVERY 8 HOURS
Status: DISCONTINUED | OUTPATIENT
Start: 2019-08-21 | End: 2019-08-21 | Stop reason: HOSPADM

## 2019-08-21 RX ORDER — HYDROMORPHONE HYDROCHLORIDE 2 MG/1
2 TABLET ORAL
Status: DISCONTINUED | OUTPATIENT
Start: 2019-08-21 | End: 2019-08-21 | Stop reason: HOSPADM

## 2019-08-21 RX ORDER — PHENYLEPHRINE HCL IN 0.9% NACL 1 MG/10 ML
SYRINGE (ML) INTRAVENOUS AS NEEDED
Status: DISCONTINUED | OUTPATIENT
Start: 2019-08-21 | End: 2019-08-21 | Stop reason: HOSPADM

## 2019-08-21 RX ORDER — DIPHENHYDRAMINE HCL 25 MG
25 CAPSULE ORAL
Status: DISCONTINUED | OUTPATIENT
Start: 2019-08-21 | End: 2019-08-21 | Stop reason: HOSPADM

## 2019-08-21 RX ORDER — ROCURONIUM BROMIDE 10 MG/ML
INJECTION, SOLUTION INTRAVENOUS AS NEEDED
Status: DISCONTINUED | OUTPATIENT
Start: 2019-08-21 | End: 2019-08-21 | Stop reason: HOSPADM

## 2019-08-21 RX ORDER — ONDANSETRON 2 MG/ML
4 INJECTION INTRAMUSCULAR; INTRAVENOUS
Status: DISCONTINUED | OUTPATIENT
Start: 2019-08-21 | End: 2019-08-21 | Stop reason: HOSPADM

## 2019-08-21 RX ORDER — DIAZEPAM 5 MG/1
5 TABLET ORAL
Status: DISCONTINUED | OUTPATIENT
Start: 2019-08-21 | End: 2019-08-21 | Stop reason: HOSPADM

## 2019-08-21 RX ORDER — HYDROMORPHONE HYDROCHLORIDE 1 MG/ML
INJECTION, SOLUTION INTRAMUSCULAR; INTRAVENOUS; SUBCUTANEOUS
Status: DISCONTINUED
Start: 2019-08-21 | End: 2019-08-21 | Stop reason: HOSPADM

## 2019-08-21 RX ORDER — ONDANSETRON 2 MG/ML
INJECTION INTRAMUSCULAR; INTRAVENOUS AS NEEDED
Status: DISCONTINUED | OUTPATIENT
Start: 2019-08-21 | End: 2019-08-21 | Stop reason: HOSPADM

## 2019-08-21 RX ORDER — SODIUM CHLORIDE 0.9 % (FLUSH) 0.9 %
5-40 SYRINGE (ML) INJECTION EVERY 8 HOURS
Status: DISCONTINUED | OUTPATIENT
Start: 2019-08-21 | End: 2019-08-21 | Stop reason: HOSPADM

## 2019-08-21 RX ORDER — SODIUM CHLORIDE 0.9 % (FLUSH) 0.9 %
5-40 SYRINGE (ML) INJECTION AS NEEDED
Status: DISCONTINUED | OUTPATIENT
Start: 2019-08-21 | End: 2019-08-21 | Stop reason: HOSPADM

## 2019-08-21 RX ORDER — HYDROMORPHONE HYDROCHLORIDE 1 MG/ML
0.2 INJECTION, SOLUTION INTRAMUSCULAR; INTRAVENOUS; SUBCUTANEOUS
Status: DISCONTINUED | OUTPATIENT
Start: 2019-08-21 | End: 2019-08-21 | Stop reason: HOSPADM

## 2019-08-21 RX ORDER — LISINOPRIL 10 MG/1
10 TABLET ORAL DAILY
Status: DISCONTINUED | OUTPATIENT
Start: 2019-08-21 | End: 2019-08-21 | Stop reason: HOSPADM

## 2019-08-21 RX ORDER — SODIUM CHLORIDE, SODIUM LACTATE, POTASSIUM CHLORIDE, CALCIUM CHLORIDE 600; 310; 30; 20 MG/100ML; MG/100ML; MG/100ML; MG/100ML
100 INJECTION, SOLUTION INTRAVENOUS CONTINUOUS
Status: DISCONTINUED | OUTPATIENT
Start: 2019-08-21 | End: 2019-08-21 | Stop reason: HOSPADM

## 2019-08-21 RX ORDER — METHYLENE BLUE 10 MG/ML
INJECTION INTRAVENOUS AS NEEDED
Status: DISCONTINUED | OUTPATIENT
Start: 2019-08-21 | End: 2019-08-21 | Stop reason: HOSPADM

## 2019-08-21 RX ORDER — ACETAMINOPHEN 500 MG
1000 TABLET ORAL EVERY 6 HOURS
Status: DISCONTINUED | OUTPATIENT
Start: 2019-08-21 | End: 2019-08-21 | Stop reason: HOSPADM

## 2019-08-21 RX ORDER — NALOXONE HYDROCHLORIDE 0.4 MG/ML
0.1 INJECTION, SOLUTION INTRAMUSCULAR; INTRAVENOUS; SUBCUTANEOUS AS NEEDED
Status: DISCONTINUED | OUTPATIENT
Start: 2019-08-21 | End: 2019-08-21 | Stop reason: HOSPADM

## 2019-08-21 RX ORDER — FENTANYL CITRATE 50 UG/ML
50 INJECTION, SOLUTION INTRAMUSCULAR; INTRAVENOUS AS NEEDED
Status: DISCONTINUED | OUTPATIENT
Start: 2019-08-21 | End: 2019-08-21 | Stop reason: HOSPADM

## 2019-08-21 RX ORDER — GLYCOPYRROLATE 0.2 MG/ML
INJECTION INTRAMUSCULAR; INTRAVENOUS AS NEEDED
Status: DISCONTINUED | OUTPATIENT
Start: 2019-08-21 | End: 2019-08-21 | Stop reason: HOSPADM

## 2019-08-21 RX ORDER — HYDROMORPHONE HYDROCHLORIDE 1 MG/ML
1 INJECTION, SOLUTION INTRAMUSCULAR; INTRAVENOUS; SUBCUTANEOUS
Status: DISCONTINUED | OUTPATIENT
Start: 2019-08-21 | End: 2019-08-21 | Stop reason: HOSPADM

## 2019-08-21 RX ORDER — FENTANYL CITRATE 50 UG/ML
INJECTION, SOLUTION INTRAMUSCULAR; INTRAVENOUS AS NEEDED
Status: DISCONTINUED | OUTPATIENT
Start: 2019-08-21 | End: 2019-08-21 | Stop reason: HOSPADM

## 2019-08-21 RX ORDER — SUCCINYLCHOLINE CHLORIDE 20 MG/ML
INJECTION INTRAMUSCULAR; INTRAVENOUS AS NEEDED
Status: DISCONTINUED | OUTPATIENT
Start: 2019-08-21 | End: 2019-08-21 | Stop reason: HOSPADM

## 2019-08-21 RX ORDER — NEOSTIGMINE METHYLSULFATE 5 MG/5 ML
SYRINGE (ML) INTRAVENOUS AS NEEDED
Status: DISCONTINUED | OUTPATIENT
Start: 2019-08-21 | End: 2019-08-21 | Stop reason: HOSPADM

## 2019-08-21 RX ORDER — SCOLOPAMINE TRANSDERMAL SYSTEM 1 MG/1
1 PATCH, EXTENDED RELEASE TRANSDERMAL ONCE
Status: DISCONTINUED | OUTPATIENT
Start: 2019-08-21 | End: 2019-08-21 | Stop reason: HOSPADM

## 2019-08-21 RX ORDER — HYDROMORPHONE HYDROCHLORIDE 2 MG/1
2 TABLET ORAL
Qty: 20 TAB | Refills: 0 | Status: SHIPPED | OUTPATIENT
Start: 2019-08-21 | End: 2019-08-29 | Stop reason: SDUPTHER

## 2019-08-21 RX ORDER — DEXAMETHASONE SODIUM PHOSPHATE 4 MG/ML
INJECTION, SOLUTION INTRA-ARTICULAR; INTRALESIONAL; INTRAMUSCULAR; INTRAVENOUS; SOFT TISSUE AS NEEDED
Status: DISCONTINUED | OUTPATIENT
Start: 2019-08-21 | End: 2019-08-21 | Stop reason: HOSPADM

## 2019-08-21 RX ORDER — SODIUM CHLORIDE, SODIUM LACTATE, POTASSIUM CHLORIDE, CALCIUM CHLORIDE 600; 310; 30; 20 MG/100ML; MG/100ML; MG/100ML; MG/100ML
50 INJECTION, SOLUTION INTRAVENOUS CONTINUOUS
Status: DISCONTINUED | OUTPATIENT
Start: 2019-08-21 | End: 2019-08-21 | Stop reason: HOSPADM

## 2019-08-21 RX ORDER — GABAPENTIN 300 MG/1
300 CAPSULE ORAL 3 TIMES DAILY
Status: DISCONTINUED | OUTPATIENT
Start: 2019-08-21 | End: 2019-08-21 | Stop reason: HOSPADM

## 2019-08-21 RX ORDER — HYDROMORPHONE HYDROCHLORIDE 2 MG/ML
0.2 INJECTION, SOLUTION INTRAMUSCULAR; INTRAVENOUS; SUBCUTANEOUS
Status: DISCONTINUED | OUTPATIENT
Start: 2019-08-21 | End: 2019-08-21 | Stop reason: SDUPTHER

## 2019-08-21 RX ORDER — FAMOTIDINE 20 MG/1
20 TABLET, FILM COATED ORAL ONCE
Status: COMPLETED | OUTPATIENT
Start: 2019-08-21 | End: 2019-08-21

## 2019-08-21 RX ORDER — MIDAZOLAM HYDROCHLORIDE 1 MG/ML
INJECTION, SOLUTION INTRAMUSCULAR; INTRAVENOUS AS NEEDED
Status: DISCONTINUED | OUTPATIENT
Start: 2019-08-21 | End: 2019-08-21 | Stop reason: HOSPADM

## 2019-08-21 RX ADMIN — FAMOTIDINE 20 MG: 20 TABLET ORAL at 06:52

## 2019-08-21 RX ADMIN — PROPOFOL 50 MG: 10 INJECTION, EMULSION INTRAVENOUS at 07:43

## 2019-08-21 RX ADMIN — Medication 200 MCG: at 08:29

## 2019-08-21 RX ADMIN — FENTANYL CITRATE 100 MCG: 50 INJECTION, SOLUTION INTRAMUSCULAR; INTRAVENOUS at 07:32

## 2019-08-21 RX ADMIN — VASOPRESSIN 1 UNITS: 20 INJECTION PARENTERAL at 08:26

## 2019-08-21 RX ADMIN — MIDAZOLAM HYDROCHLORIDE 2 MG: 1 INJECTION, SOLUTION INTRAMUSCULAR; INTRAVENOUS at 07:26

## 2019-08-21 RX ADMIN — GABAPENTIN 300 MG: 300 CAPSULE ORAL at 17:34

## 2019-08-21 RX ADMIN — CEFAZOLIN 2 G: 10 INJECTION, POWDER, FOR SOLUTION INTRAVENOUS at 07:36

## 2019-08-21 RX ADMIN — Medication 300 MCG: at 08:12

## 2019-08-21 RX ADMIN — CEFAZOLIN 2 G: 10 INJECTION, POWDER, FOR SOLUTION INTRAVENOUS at 16:00

## 2019-08-21 RX ADMIN — SUCCINYLCHOLINE CHLORIDE 150 MG: 20 INJECTION INTRAMUSCULAR; INTRAVENOUS at 07:33

## 2019-08-21 RX ADMIN — ROCURONIUM BROMIDE 30 MG: 10 INJECTION, SOLUTION INTRAVENOUS at 07:48

## 2019-08-21 RX ADMIN — GLYCOPYRROLATE 0.4 MG: 0.2 INJECTION INTRAMUSCULAR; INTRAVENOUS at 08:39

## 2019-08-21 RX ADMIN — ACETAMINOPHEN 1000 MG: 500 TABLET ORAL at 11:05

## 2019-08-21 RX ADMIN — Medication 200 MCG: at 07:38

## 2019-08-21 RX ADMIN — FENTANYL CITRATE 50 MCG: 50 INJECTION, SOLUTION INTRAMUSCULAR; INTRAVENOUS at 08:35

## 2019-08-21 RX ADMIN — LIDOCAINE HYDROCHLORIDE 100 MG: 20 INJECTION, SOLUTION EPIDURAL; INFILTRATION; INTRACAUDAL; PERINEURAL at 07:33

## 2019-08-21 RX ADMIN — GLYCOPYRROLATE 0.2 MG: 0.2 INJECTION INTRAMUSCULAR; INTRAVENOUS at 07:42

## 2019-08-21 RX ADMIN — Medication 200 MCG: at 08:38

## 2019-08-21 RX ADMIN — Medication 100 MCG: at 08:34

## 2019-08-21 RX ADMIN — ROCURONIUM BROMIDE 8 MG: 10 INJECTION, SOLUTION INTRAVENOUS at 07:33

## 2019-08-21 RX ADMIN — HYDROMORPHONE HYDROCHLORIDE 0.2 MG: 2 INJECTION, SOLUTION INTRAMUSCULAR; INTRAVENOUS; SUBCUTANEOUS at 09:44

## 2019-08-21 RX ADMIN — Medication 200 MCG: at 08:45

## 2019-08-21 RX ADMIN — PROPOFOL 100 MG: 10 INJECTION, EMULSION INTRAVENOUS at 07:33

## 2019-08-21 RX ADMIN — VASOPRESSIN 1 UNITS: 20 INJECTION PARENTERAL at 08:36

## 2019-08-21 RX ADMIN — GABAPENTIN 300 MG: 300 CAPSULE ORAL at 12:25

## 2019-08-21 RX ADMIN — Medication 3 MG: at 08:39

## 2019-08-21 RX ADMIN — DEXAMETHASONE SODIUM PHOSPHATE 10 MG: 4 INJECTION, SOLUTION INTRA-ARTICULAR; INTRALESIONAL; INTRAMUSCULAR; INTRAVENOUS; SOFT TISSUE at 07:44

## 2019-08-21 RX ADMIN — Medication 100 MCG: at 08:21

## 2019-08-21 RX ADMIN — HYDROMORPHONE HYDROCHLORIDE 2 MG: 2 TABLET ORAL at 14:01

## 2019-08-21 RX ADMIN — Medication 10 MG: at 07:38

## 2019-08-21 RX ADMIN — ACETAMINOPHEN 1000 MG: 500 TABLET ORAL at 17:34

## 2019-08-21 RX ADMIN — SODIUM CHLORIDE, SODIUM LACTATE, POTASSIUM CHLORIDE, AND CALCIUM CHLORIDE 100 ML/HR: 600; 310; 30; 20 INJECTION, SOLUTION INTRAVENOUS at 06:52

## 2019-08-21 RX ADMIN — ONDANSETRON 4 MG: 2 INJECTION INTRAMUSCULAR; INTRAVENOUS at 08:42

## 2019-08-21 RX ADMIN — Medication 300 MCG: at 07:48

## 2019-08-21 NOTE — OP NOTES
[facility]  OPERATIVE REPORT    Name:  Medardo Bacon  MR#:   [id number]  :  1960  ACCOUNT #:  [de-identified]  DATE OF SERVICE:  2019    PREOPERATIVE DIAGNOSIS:  Cervical spondylosis with stenosis and herniated nucleus pulposus, C5-6. POSTOPERATIVE DIAGNOSIS:  Cervical spondylosis with stenosis and herniated nucleus pulposus, C5-6. PROCEDURE PERFORMED:  Anterior cervical decompression and fusion, C5-6; structural allograft x1; anterior cervical plate fixation at T6-4 with K2M anterior cervical locking plate. SURGEON:  Berna Dsouza MD.    ASSISTANT:  None. ANESTHESIA:  General endotracheal.    COMPLICATIONS:  None. SPECIMENS REMOVED:  None. IMPLANTS:  K2M anterior cervical locking plate and structural allograft. ESTIMATED BLOOD LOSS:  5 mL. FINDINGS:  The patient had spondylotic stenosis at C5-6. Bone quality was marginally poor. OPERATION:  Following induction of general endotracheal anesthesia, the patient was placed with the head in neutral position on a Pa headrest.  The patient was prepped and draped in usual fashion. Right-sided approach was utilized. Sternocleidomastoid and great vessels mobilized laterally. Esophagus and trachea mobilized medially with blunt finger dissection. Prevertebral fascia was entered and C-arm image verified our surgical level. Naylor pins were placed in bodies of C5 and C6. Cloward self-retaining retractor blades were placed under the cover of the longus colli musculature bilaterally. C-arm image verified our surgical level. Annular tissue was incised. Radical diskectomy done back to the posterior margin. Posterior marginal osteophytes were thinned with a high-speed bur and a thorough decompression done from uncinate to uncinate, specially thorough on the right where the patient's most significant stenosis was. Posterior longitudinal ligament was taken and a thorough decompression done of the epidural space. Palpation of the foramina was successful bilaterally and no evidence of stenosis remained. Endplates were prepared and a #8 structural allograft tamped firmly into place with excellent bony apposition and a #7 was loose. An anterior cervical locking plate was then utilized with two screws in C5, two in C6, and a locking device engaged. The wound was irrigated. There was absolutely no bleeding. Vancomycin powder instilled for infection prophylaxis. A deep drain utilized for routine. The neck was then closed in layers and the skin closed with subcuticular suture and Dermabond. A sterile occlusive dressing placed upon the wound. All counts were correct.       MD JONA [id number]  D:  08/21/2019 8:58  T:  08/21/2019 9:06  JOB #:  [id number]

## 2019-08-21 NOTE — ANESTHESIA PREPROCEDURE EVALUATION
Relevant Problems   No relevant active problems       Anesthetic History     PONV          Review of Systems / Medical History  Patient summary reviewed and pertinent labs reviewed    Pulmonary  Within defined limits                 Neuro/Psych   Within defined limits           Cardiovascular    Hypertension: well controlled              Exercise tolerance: >4 METS     GI/Hepatic/Renal  Within defined limits              Endo/Other  Within defined limits           Other Findings            Physical Exam    Airway  Mallampati: III  TM Distance: 4 - 6 cm  Neck ROM: decreased range of motion   Mouth opening: Normal     Cardiovascular    Rhythm: regular  Rate: normal         Dental  No notable dental hx       Pulmonary  Breath sounds clear to auscultation               Abdominal  GI exam deferred       Other Findings            Anesthetic Plan    ASA: 2  Anesthesia type: general          Induction: Intravenous  Anesthetic plan and risks discussed with: Patient

## 2019-08-21 NOTE — PROGRESS NOTES
Problem: Mobility Impaired (Adult and Pediatric)  Goal: *Acute Goals and Plan of Care (Insert Text)  Outcome: Progressing Towards Goal   PHYSICAL THERAPY EVALUATION AND DISCHARGE    Patient: Delfino Boo (55 y.o. female)  Date: 2019  Primary Diagnosis: HNP (herniated nucleus pulposus), cervical [M50.20]  HNP (herniated nucleus pulposus), cervical [M50.20]  Procedure(s) (LRB):  ANTERIOR CERVICAL DISCECTOMY WITH FUSION C5/6; C-ARM/K2M (N/A) Day of Surgery   Precautions:      PLOF: Patient lives with spouse in a single story home with 4 CHAYO. Independent PTA. ASSESSMENT :  Patient supine in bed, agreeable to participation with PT. Soft cervical collar with patient. Independent with supine <> sit, sit <> stand, and ambulation x 500 ft without AD. No gait deviations present. Patient educated on cervical precautions; patient verbalizes understanding. Also d/w patient safe strategies to carryout daily tasks while adhering to precautions. Patient reporting relief of R UE symptoms; reports some cervical/incisional pain. Patient left supine in bed with all needs. Patient educated on role of PT, PT POC, bed mobility, transfers, gait, and safety with mobility as indicated. Patient does not require further skilled intervention at this level of care. No d/c recommendations at this time. PLAN :  Recommendations and Planned Interventions:   No formal PT needs identified at this time. Discharge Recommendations: None  Further Equipment Recommendations for Discharge: N/A     SUBJECTIVE:   Patient stated What can I use so I don't have to hold my phone when I'm using it? Sherine Ace    OBJECTIVE DATA SUMMARY:     Past Medical History:   Diagnosis Date    Hypertension     Ill-defined condition 2008    sub arachnoid hemorrhage     Past Surgical History:   Procedure Laterality Date    HX BREAST REDUCTION Bilateral     HX  SECTION      HX  SECTION      HX CHOLECYSTECTOMY      HX GASTRIC BYPASS  2002    HX GYN  1993    uterus and cervix removed    HX HYSTERECTOMY      HX OOPHORECTOMY  2006    HX ORTHOPAEDIC Right 2000    \"wrist surgically fixed\" per patient    [address]    \"Most of Thyroid removed\" per patient. HX TONSILLECTOMY  1965     Barriers to Learning/Limitations: None  Compensate with: N/A  Home Situation:   Home Situation  Home Environment: Private residence  # Steps to Enter: 6  One/Two Story Residence: One story  Living Alone: No  Support Systems: Spouse/Significant Other/Partner  Patient Expects to be Discharged to[de-identified] Private residence  [facility]:  Neurologic State: Alert  Orientation Level: Oriented X4  Cognition: Follows commands     Psychosocial  Patient Behaviors: Calm; Cooperative  Purposeful Interaction: Yes     Strength:    Strength: Within functional limits    Tone & Sensation:   Tone: Normal    Range Of Motion:  AROM: Within functional limits    Functional Mobility:  Bed Mobility:  Rolling: Independent  Supine to Sit: Independent  Sit to Supine: Independent     Transfers:  Sit to Stand: Independent  Stand to Sit: Independent    Balance:   Sitting: Intact  Standing: Intact  Ambulation/Gait Training:  Distance (ft): 500 Feet (ft)  Assistive Device: (No AD)  Ambulation - Level of Assistance: Independent     Gait Description (WDL): Exceptions to WDL    Pain:  Neck pain (incisional)  Pain level pre-treatment: 1/10   Pain level post-treatment: 1/10  Pain Intervention(s): N/A    Activity Tolerance:   Good    Please refer to the flowsheet for vital signs taken during this treatment. After treatment:   ?         Patient left in no apparent distress sitting up in chair  ? Patient left in no apparent distress in bed  ? Call bell left within reach  ? Nursing notified  ? Caregiver present  ? Bed alarm activated  ?          SCDs applied    COMMUNICATION/EDUCATION:   ?         Role of Physical Therapy in the acute care setting. ?         Fall prevention education was provided and the patient/caregiver indicated understanding. ? Patient/family have participated as able in goal setting and plan of care. ?         Patient/family agree to work toward stated goals and plan of care. ?         Patient understands intent and goals of therapy, but is neutral about his/her participation. ? Patient is unable to participate in goal setting/plan of care: ongoing with therapy staff. ?         Other:     Thank you for this referral.  Nettie Farah   Time Calculation: 12 mins      Eval Complexity: History: MEDIUM  Complexity : 1-2 comorbidities / personal factors will impact the outcome/ POC Exam:MEDIUM Complexity : 3 Standardized tests and measures addressing body structure, function, activity limitation and / or participation in recreation  Presentation: LOW Complexity : Stable, uncomplicated  Clinical Decision Making:Low Complexity Patient independent  Overall Complexity:LOW

## 2019-08-21 NOTE — ANESTHESIA POSTPROCEDURE EVALUATION
Procedure(s):  ANTERIOR CERVICAL DISCECTOMY WITH FUSION C5/6; C-ARM/K2M. general    Anesthesia Post Evaluation      Multimodal analgesia: multimodal analgesia used between 6 hours prior to anesthesia start to PACU discharge  Patient location during evaluation: bedside  Patient participation: complete - patient participated  Level of consciousness: awake  Pain management: adequate  Airway patency: patent  Anesthetic complications: no  Cardiovascular status: stable  Respiratory status: acceptable  Hydration status: acceptable  Post anesthesia nausea and vomiting:  controlled      Vitals Value Taken Time   /70 8/21/2019 10:06 AM   Temp 36.7 °C (98.1 °F) 8/21/2019  9:05 AM   Pulse 90 8/21/2019 10:14 AM   Resp 13 8/21/2019 10:14 AM   SpO2 96 % 8/21/2019 10:14 AM   Vitals shown include unvalidated device data.

## 2019-08-21 NOTE — PERIOP NOTES
TRANSFER - OUT REPORT:    Verbal report given to ELVIN ALLEN(name) on [facility]  being transferred to [address] [place] [address](unit) for routine post - op       Report consisted of patients Situation, Background, Assessment and   Recommendations(SBAR). Information from the following report(s) SBAR, Kardex, OR Summary, Procedure Summary, Intake/Output, MAR, Recent Results and Med Rec Status was reviewed with the receiving nurse. Lines:   Peripheral IV 08/21/19 Right Hand (Active)   Site Assessment Clean, dry, & intact 8/21/2019  9:04 AM   Phlebitis Assessment 0 8/21/2019  9:04 AM   Infiltration Assessment 0 8/21/2019  9:04 AM   Dressing Status Clean, dry, & intact 8/21/2019  9:04 AM   Dressing Type Tape;Transparent 8/21/2019  9:04 AM   Hub Color/Line Status Pink;Capped 8/21/2019  9:04 AM       Peripheral IV 08/21/19 Left Arm (Active)   Site Assessment Clean, dry, & intact 8/21/2019  9:04 AM   Phlebitis Assessment 0 8/21/2019  9:04 AM   Infiltration Assessment 0 8/21/2019  9:04 AM   Dressing Status Clean, dry, & intact 8/21/2019  9:04 AM   Dressing Type Tape;Transparent 8/21/2019  9:04 AM   Hub Color/Line Status Pink; Infusing 8/21/2019  9:04 AM        Opportunity for questions and clarification was provided.       Patient transported with:   Registered Nurse

## 2019-08-21 NOTE — PROGRESS NOTES
OR today ACDF C5/6    VSS, LS clear, Apical pulse regular  Dressing clean, dry and intact, Soft collar  EUGENIE:about 30cc now  UA: voiding w/out difficulty  PT:cleared by PT to be DC-Home  Neuro  Intact, reports RUE pain is gone, swallowing w/out difficulty  Moving all extremities. Good strength to BUE  Plan: pt wants to go home. I will DC her.  She will call us tomorrow morning regarding her drain output, so we can decide to take it out tomorrow or Friday    Clement Ramirez NP

## 2019-08-21 NOTE — PROGRESS NOTES
Reason for Admission:  HNP (herniated nucleus pulposus), cervical [M50.20]  HNP (herniated nucleus pulposus), cervical [M50.20]                 RRAT Score:    5            Plan for utilizing home health:    Yes foc signed for [facility]                      Likelihood of Readmission:   LOW                         Transition of Care Plan:              Initial assessment completed with patient. Cognitive status of patient: oriented to time, place, person and situation. Face sheet information confirmed:  yes. The patient designates , mother to participate in her discharge plan and to receive any needed information. This patient lives in a single family home with , other family members are taking shift staying with patient as well. Patient is able to navigate steps as needed. Prior to hospitalization, patient was considered to be independent with ADLs/IADLS : yes . Patient has a current ACP document on file: no  The /mother will be available to transport patient home upon discharge. The patient has no medical equipment available in the home. Patient is not currently active with home health. Patient has not stayed in a skilled nursing facility or rehab. This patient is on dialysis :no      List of available Home Health agencies were provided and reviewed with the patient prior to discharge. Freedom of choice signed: yes, for [facility]. Currently, the discharge plan is Home with [address] [place] [address] Jason Villalobos. The patient states that she can obtain her medications from the pharmacy, and take her medications as directed. Patient's current insurance is [employer]       Care Management Interventions  PCP Verified by CM:  Yes  Palliative Care Criteria Met (RRAT>21 & CHF Dx)?: No  Mode of Transport at Discharge: Self  Transition of Care Consult (CM Consult): [address]: Yes  MyChart Signup: No  Discharge Durable Medical Equipment: No  Physical Therapy Consult: Yes  Occupational Therapy Consult: Yes  Speech Therapy Consult: No  Current Support Network: Lives with Spouse  Confirm Follow Up Transport: Family  Plan discussed with Pt/Family/Caregiver: Yes  Freedom of Choice Offered: Yes  Discharge Location  Discharge Placement: Home with home health        MANUEL Drew  Case Management  [phone]

## 2019-08-21 NOTE — PROGRESS NOTES
Discharge order noted for today. Pt has been accepted to [facility] agency. Met with patient and  and are agreeable to the transition plan today. Transport has been arranged through . Patient's discharge summary and home health  orders have been forwarded to [facility] health  agency via que. Updated bedside RN,  to the transition plan. Discharge information has been documented on the AVS.  No DME needed.        MANUEL Vega  Case Management  [phone]

## 2019-08-21 NOTE — HOME CARE
Received HH referral, Discharge noted for today, [facility] will follow for PT/Rhona protocol , pt has no DME needs , pt will be discharging with EUGENIE drain,but will go to Dr Sydnie Agustin office tomorrow for drain removal. Alfredito Poster.

## 2019-08-21 NOTE — DISCHARGE SUMMARY
Discharge  Summary     Patient: Danie Dc MRN: [id number]  SSN: [id number]    YOB: 1960  Age: 63 y.o. Sex: female       Admit Date: 8/21/2019    Discharge Date: 8/21/2019      Admission Diagnoses: HNP (herniated nucleus pulposus), cervical [M50.20]  HNP (herniated nucleus pulposus), cervical [M50.20]    Discharge Diagnoses:   Problem List as of 8/21/2019 Date Reviewed: 8/7/2019          Codes Class Noted - Resolved    HNP (herniated nucleus pulposus), cervical ICD-10-CM: M50.20  ICD-9-CM: 722.0  8/21/2019 - Present               Discharge Condition: Good    Procedure: Anterior cervical decompression and fusion, C5-6; structural allograft x1; anterior cervical plate fixation at K5-1 with K2M anterior cervical locking plate. Hospital Course: DC-Home w/ EUGENIE, f/o next day  Anterior cervical decompression and fusion, C5-6; structural allograft x1; anterior cervical plate fixation at G7-3 with K2M anterior cervical locking plate. Disposition: home    Discharge Medications:   Current Discharge Medication List      START taking these medications    Details   HYDROmorphone (DILAUDID) 2 mg tablet Take 1 Tab by mouth every six (6) hours as needed for Pain for up to 14 days. Max Daily Amount: 8 mg. Qty: 20 Tab, Refills: 0    Associated Diagnoses: HNP (herniated nucleus pulposus), cervical         CONTINUE these medications which have NOT CHANGED    Details   ergocalciferol (ERGOCALCIFEROL) 50,000 unit capsule Take 1 Cap by mouth every seven (7) days. lisinopril (PRINIVIL, ZESTRIL) 10 mg tablet Take 1 Tab by mouth daily. loratadine (CLARITIN) 10 mg tablet Take 1 Tab by mouth daily. metroNIDAZOLE (METROGEL) 1 % topical gel Apply  to affected area daily. Use a thin layer to affected areas after washing      acetaminophen (TYLENOL EXTRA STRENGTH) 500 mg tablet Take  by mouth every six (6) hours as needed for Pain.       methocarbamol (ROBAXIN) 500 mg tablet Take 1 tab by mouth BID-TID as needed for muscle spasms  Indications: muscle spasm  Qty: 90 Tab, Refills: 3    Associated Diagnoses: Muscle spasm      gabapentin (NEURONTIN) 300 mg capsule Take 1 cap by mouth in the morning and 2 at night as directed. Qty: 90 Cap, Refills: 1    Associated Diagnoses: Right cervical radiculopathy         STOP taking these medications       predniSONE (DELTASONE) 10 mg tablet Comments:   Reason for Stopping: Follow-up Appointments   Procedures    FOLLOW UP VISIT Appointment in: Two Weeks Tomorrow am for drain removal and in 2 wks     Tomorrow am for drain removal and in 2 wks     Standing Status:   Standing     Number of Occurrences:   1     Order Specific Question:   Appointment in     Answer:    Two Weeks       Signed By: Katya Nur NP     August 21, 2019

## 2019-08-21 NOTE — BRIEF OP NOTE
BRIEF OPERATIVE NOTE    Date of Procedure: 8/21/2019   Preoperative Diagnosis: HNP (herniated nucleus pulposus), cervical [M50.20]  Postoperative Diagnosis: HNP (herniated nucleus pulposus), cervical [M50.20]    Procedure(s):  ANTERIOR CERVICAL DISCECTOMY WITH FUSION C5/6; C-ARM/K2M  Surgeon(s) and Role:     Amina Soares MD - Primary         Surgical Assistant: 0    Surgical Staff:  Circ-1: Priscila Enamorado RN  Circ-Relief: Shiva Wooten RN  Radiology Technician: Artur Posey  Scrub Tech-1: America Lencho  Surg Asst-1: [place] Imus, Darius B  Event Time In Time Out   Incision Start 3647    Incision Close       Anesthesia: General   Estimated Blood Loss: 5  Specimens: * No specimens in log *   Findings: stenosis   Complications: 0  Implants:   Implant Name Type Inv.  Item Serial No.  Lot No. LRB No. Used Action   SPACER BNE CERV [id number] -- Mammie Lorne W/PLUG - [id number]  SPACER BNE CERV [id number] -- VIKOS ALLOGRAFT W/PLUG [id number] [employer]  N/A 1 Implanted

## 2019-08-21 NOTE — INTERVAL H&P NOTE
H&P Update:  Tammi Martel was seen and examined. History and physical has been reviewed. The patient has been examined.  There have been no significant clinical changes since the completion of the originally dated History and Physical.

## 2019-08-21 NOTE — PROGRESS NOTES
PT orders received and chart reviewed. PT evaluation complete. Patient mobilizing independently. Expressing relief of R UE symptoms. Cleared for d/c home. Full report to follow.      Antony Aparicio PT, DPT  Office extension: [phone]

## 2019-08-21 NOTE — ROUTINE PROCESS
TRANSFER - IN REPORT:    Verbal report received from Cassia(name) on Maryam Pun  being received from [facility]) for routine post - op      Report consisted of patients Situation, Background, Assessment and   Recommendations(SBAR). Information from the following report(s) SBAR was reviewed with the receiving nurse. Opportunity for questions and clarification was provided. Assessment completed upon patients arrival to unit and care assumed.

## 2019-08-21 NOTE — DISCHARGE INSTRUCTIONS
PATIENT DISCHARGE INSTRUCTIONS      PATIENT DISCHARGE INSTRUCTIONS    Trey Muniz / [id number] : 1960    Admitted 2019 Discharged: 2019       · It is important that you take the medication exactly as they are prescribed. · Keep your medication in the bottles provided by the pharmacist and keep a list of the medication names, dosages, and times to be taken in your wallet. · Do not take other medications without consulting your doctor. What to do at Home    Recommended Diet: soft diet    Recommended Activity: No lifting, Driving, or Strenuous exercise for 2 wks    If you experience any of the following symptoms  fever greater than 100.5, wound drainage, redness at incision site, increased pain, new onset of extremity numbness/tingling/weakness or gait disturbance, bladder or bowel dysfunction. , please follow up with spine center    Patient Education        Surgical Drain Care: Care Instructions  Your Care Instructions    After a surgery, fluid may collect inside your body in the surgical area. This makes an infection or other problems more likely. A surgical drain allows the fluid to flow out. The doctor puts a thin, flexible rubber tube into the area of your body where the fluid is likely to collect. The rubber tube carries the fluid outside your body. The most common type of surgical drain carries the fluid into a collection bulb that you empty. This is called a Bakari-Valentine (EUGENIE) drain. The drain uses suction created by the bulb to pull the fluid from your body into the bulb. The rubber tube will probably be held in place by one or two stitches in your skin. The bulb will probably be attached with a safety pin to your clothes or near the bandage so that it doesn't flip around or pull on the stitches. Another type of drain is called a Penrose drain. This type of drain doesn't have a bulb. Instead, the end of the tube is open.  That allows the fluid to drain onto a dressing taped to your skin. The drain may be kept in place next to your skin with a stitch or a safety pin in the tube. When you first get the drain, the fluid will be bloody. It will change color from red to pink to a light yellow or clear as the wound heals and the fluid starts to go away. Your doctor may give you information on when you no longer need the drain and when it will be removed. Follow-up care is a key part of your treatment and safety. Be sure to make and go to all appointments, and call your doctor if you are having problems. It's also a good idea to know your test results and keep a list of the medicines you take. How can you care for yourself at home? How to empty the bulb of a Bakari-Valentine drain  Follow any instructions your doctor gives you. How often you empty the bulb depends on how much fluid is draining. Empty the bulb when it is half full. 4. Wash your hands with soap and water. 5. Take the plug out of the bulb. 6. Empty the bulb. If your doctor asks you to measure the fluid, empty the fluid into a measuring cup, and write down the color and how much you collected. Your doctor will want to know this information. 7. Clean the plug with alcohol. 8. Squeeze the bulb until it is flat. This removes all the air from the bulb. You may need to put the bulb on a table or a counter to flatten it. 9. Keep the bulb flat, and put the plug in. The bulb should stay flat after you put the plug back in. This creates the suction that pulls the fluid into the bulb. 10. Empty the fluid into the toilet. 11. Wash your hands. How to change the dressing around your surgical drain  You may have a dressing (bandage). The dressing is often made of gauze pads held on with tape. Your doctor will tell you how often to change it. 1. Wash your hands with soap and water. 2. Take off the dressing from around the drain. 3. Clean the drain site and the skin around it with soap and water.    Use gauze or a cotton swab.  4. When the site is dry, put on a new dressing. The way your dressing is put on depends on what kind of drain you have. You will get instructions for your type of drain. 5. Wash your hands again with soap and water. Your doctor may ask you to keep track of your dressing changes. Write down the time of day and the amount and color of the fluid on the dressing. How to help prevent clogs in your surgical drain  Squeezing or \"milking\" the tube of your surgical drain can help prevent clogs so that it drains correctly. Your doctor will tell you when you need to do this. In general, you do this when:  · You see a clot in the tube that prevents fluid from draining. The clot may look like a dark, stringy lining. · You see fluid leaking around the tube where it goes into the skin. Follow these steps for milking the tube. 1. Use one hand to hold and pinch the tube where it leaves the skin. 2. With the thumb and first finger of your other hand, pinch the tube just below where you're holding it. 3. Slowly and firmly push your thumb and first finger down the tubing toward the end of the tube. 4. Repeat this as many times as needed to move the clot. If you have a Bakari-Valentine (EUGENIE) drain, the clot should move down the tube and into the bulb. If you have a Penrose drain, the clot should move into the dressing. When should you call for help? Call your doctor now or seek immediate medical care if:    · You have signs of infection, such as:  ? Increased pain, swelling, warmth, or redness around the area. ? Red streaks leading from the area. ? Pus draining from the area. ? A fever.     · You see a sudden change in the color or smell of the drainage.     · The tube is coming loose where it leaves your skin.    Watch closely for changes in your health, and be sure to contact your doctor if:    · You see a lot of fluid around the drain.     · You cannot remove a clot from the tube by milking the tube.    Where can you learn more? Go to http://rachel-omid.info/. Enter K117 in the search box to learn more about \"Surgical Drain Care: Care Instructions. \"  Current as of: 2018  Content Version: 12.1  © 4687-3018 [employer]. Care instructions adapted under license by [employer] (which disclaims liability or warranty for this information). If you have questions about a medical condition or this instruction, always ask your healthcare professional. [place] [address] any warranty or liability for your use of this information. .  Patient armband removed and shredded  MyChart Activation    Thank you for requesting access to [employer]. Please follow the instructions below to securely access and download your online medical record. [employer] allows you to send messages to your doctor, view your test results, renew your prescriptions, schedule appointments, and more. How Do I Sign Up? 1. In your internet browser, go to [email or web address]  2. Click on the First Time User? Click Here link in the Sign In box. You will be redirect to the New Member Sign Up page. 3. Enter your [employer] Access Code exactly as it appears below. You will not need to use this code after youve completed the sign-up process. If you do not sign up before the expiration date, you must request a new code. [employer] Access Code: [email or web address]  Expires: 2019  5:12 PM (This is the date your [employer] access code will )    4. Enter the last four digits of your Social Security Number (xxxx) and Date of Birth (mm/dd/yyyy) as indicated and click Submit. You will be taken to the next sign-up page. 5. Create a [employer] ID. This will be your [employer] login ID and cannot be changed, so think of one that is secure and easy to remember. 6. Create a [employer] password. You can change your password at any time. 7. Enter your Password Reset Question and Answer.  This can be used at a later time if you forget your password. 8. Enter your e-mail address. You will receive e-mail notification when new information is available in [address] 9. Click Sign Up. You can now view and download portions of your medical record. 10. Click the Download Summary menu link to download a portable copy of your medical information. Additional Information    If you have questions, please visit the Frequently Asked Questions section of the [employer] website at [email or web address] Remember, [employer] is NOT to be used for urgent needs. For medical emergencies, dial 911. DISCHARGE SUMMARY from Nurse    PATIENT INSTRUCTIONS:    After general anesthesia or intravenous sedation, for 24 hours or while taking prescription Narcotics:  · Limit your activities  · Do not drive and operate hazardous machinery  · Do not make important personal or business decisions  · Do  not drink alcoholic beverages  · If you have not urinated within 8 hours after discharge, please contact your surgeon on call. Report the following to your surgeon:  · Excessive pain, swelling, redness or odor of or around the surgical area  · Temperature over 100.5  · Nausea and vomiting lasting longer than 4 hours or if unable to take medications  · Any signs of decreased circulation or nerve impairment to extremity: change in color, persistent  numbness, tingling, coldness or increase pain  · Any questions    What to do at Home:  Recommended activity: Activity as tolerated, NO Bending, Lifting, Twisting    If you experience any of the following symptoms Nausea, vomiting, chest pain, shortness of breath, fever greater than 101.5, please follow up with PCP. *  Please give a list of your current medications to your Primary Care Provider. *  Please update this list whenever your medications are discontinued, doses are      changed, or new medications (including over-the-counter products) are added.     *  Please carry medication information at all times in case of emergency situations. These are general instructions for a healthy lifestyle:    No smoking/ No tobacco products/ Avoid exposure to second hand smoke  Surgeon General's Warning:  Quitting smoking now greatly reduces serious risk to your health. Obesity, smoking, and sedentary lifestyle greatly increases your risk for illness    A healthy diet, regular physical exercise & weight monitoring are important for maintaining a healthy lifestyle    You may be retaining fluid if you have a history of heart failure or if you experience any of the following symptoms:  Weight gain of 3 pounds or more overnight or 5 pounds in a week, increased swelling in our hands or feet or shortness of breath while lying flat in bed. Please call your doctor as soon as you notice any of these symptoms; do not wait until your next office visit. The discharge information has been reviewed with the patient. The patient verbalized understanding. Discharge medications reviewed with the patient and appropriate educational materials and side effects teaching were provided.   ___________________________________________________________________________________________________________________________________

## 2019-08-21 NOTE — PROGRESS NOTES
conducted a pre-surgery visit with Magui Leora, who is a 63 y.o.,female. The  provided the following Interventions:  Initiated a relationship of care and support. Plan:  Chaplains will continue to follow and will provide pastoral care on an as needed/requested basis.  recommends bedside caregivers page  on duty if patient shows signs of acute spiritual or emotional distress.     [address] [facility] Certified [address] [facility]   [phone]

## 2019-08-22 ENCOUNTER — TELEPHONE (OUTPATIENT)
Dept: ORTHOPEDIC SURGERY | Age: 59
End: 2019-08-22

## 2019-08-22 ENCOUNTER — OFFICE VISIT (OUTPATIENT)
Dept: ORTHOPEDIC SURGERY | Age: 59
End: 2019-08-22

## 2019-08-22 ENCOUNTER — HOME CARE VISIT (OUTPATIENT)
Dept: SCHEDULING | Facility: HOME HEALTH | Age: 59
End: 2019-08-22
Payer: OTHER GOVERNMENT

## 2019-08-22 VITALS
DIASTOLIC BLOOD PRESSURE: 58 MMHG | TEMPERATURE: 97.7 F | WEIGHT: 239 LBS | RESPIRATION RATE: 18 BRPM | HEIGHT: 71 IN | HEART RATE: 76 BPM | SYSTOLIC BLOOD PRESSURE: 103 MMHG | BODY MASS INDEX: 33.46 KG/M2

## 2019-08-22 VITALS
DIASTOLIC BLOOD PRESSURE: 68 MMHG | OXYGEN SATURATION: 97 % | SYSTOLIC BLOOD PRESSURE: 100 MMHG | HEART RATE: 69 BPM | TEMPERATURE: 97 F

## 2019-08-22 DIAGNOSIS — M50.20 HNP (HERNIATED NUCLEUS PULPOSUS), CERVICAL: ICD-10-CM

## 2019-08-22 DIAGNOSIS — Z98.1 S/P CERVICAL SPINAL FUSION: Primary | ICD-10-CM

## 2019-08-22 PROCEDURE — 400013 HH SOC

## 2019-08-22 PROCEDURE — 3331090001 HH PPS REVENUE CREDIT

## 2019-08-22 PROCEDURE — G0151 HHCP-SERV OF PT,EA 15 MIN: HCPCS

## 2019-08-22 PROCEDURE — 3331090002 HH PPS REVENUE DEBIT

## 2019-08-22 NOTE — PATIENT INSTRUCTIONS
Cervical Spinal Fusion: What to Expect at Home  Your Recovery    After surgery, you can expect your neck to feel stiff and sore. This should improve in the weeks after surgery. You may have trouble sitting or standing in one position for very long and may need pain medicine in the weeks after your surgery. You may need to wear a neck brace for a while. It may take 4 to 6 weeks to get back to your usual activities, but it may depend on what kind of surgery you had. Your doctor may advise you to work with a physical therapist to strengthen the muscles around your neck and back. This care sheet gives you a general idea about how long it will take for you to recover. But each person recovers at a different pace. Follow the steps below to get better as quickly as possible. How can you care for yourself at home? Activity    · Rest when you feel tired. Getting enough sleep will help you recover.     · Try to walk each day. Start by walking a little more than you did the day before. Bit by bit, increase the amount you walk. Walking boosts blood flow and helps prevent pneumonia and constipation. Walking may also decrease your muscle soreness after surgery.     · Follow your doctor's directions about not lifting anything that would strain your neck and back. This may include a child, heavy grocery bags and milk containers, a heavy briefcase or backpack, cat litter or dog food bags, or a vacuum .     · Avoid strenuous activities, such as bicycle riding, jogging, weightlifting, or aerobic exercise, until your doctor says it is okay.     · Do not drive for 2 to 4 weeks after your surgery or until your doctor says it is okay.     · Avoid taking long car trips for 2 to 4 weeks after surgery. Your neck may become tired and painful from sitting too long in one position.     · You will probably need to take 4 to 6 weeks off from work.  It depends on the type of work you do and how you feel.     · You may have sex as soon as you feel able, but avoid positions that put stress on your neck or cause pain. Diet    · You can eat your normal diet. If your stomach is upset, try bland, low-fat foods like plain rice, broiled chicken, toast, and yogurt.     · Drink plenty of fluids. If you have kidney, heart, or liver disease and have to limit fluids, talk with your doctor before you increase the amount of fluids you drink.     · You may notice that your bowel movements are not regular right after your surgery. This is common. Try to avoid constipation and straining with bowel movements. You may want to take a fiber supplement every day. If you have not had a bowel movement after a couple of days, ask your doctor about taking a mild laxative. Medicines    · Your doctor will tell you if and when you can restart your medicines. He or she will also give you instructions about taking any new medicines.     · If you take blood thinners, such as warfarin (Coumadin), clopidogrel (Plavix), or aspirin, be sure to talk to your doctor. He or she will tell you if and when to start taking those medicines again. Make sure that you understand exactly what your doctor wants you to do.     · Be safe with medicines. Take pain medicines exactly as directed. ? If the doctor gave you a prescription medicine for pain, take it as prescribed. ? If you are not taking a prescription pain medicine, ask your doctor if you can take an over-the-counter medicine.     · If your doctor prescribed antibiotics, take them as directed. Do not stop taking them just because you feel better. You need to take the full course of antibiotics.     · If you think your pain pill is making you sick to your stomach:  ? Take your pills after meals (unless your doctor has told you not to). ? Ask your doctor for a different pain pill. Incision care    · You will be given specific instructions about how to care for the cut (incision) the doctor made.  The instructions will depend on the type of materials used to close the cut. Exercise    · Do exercises as instructed by your doctor or physical therapist to improve your strength and flexibility. Other instructions    · To reduce stiffness and help sore muscles, use a warm water bottle, a heating pad set on low, or a warm cloth on your neck. Do not put heat right over the incision. Do not go to sleep with a heating pad on your skin. Follow-up care is a key part of your treatment and safety. Be sure to make and go to all appointments, and call your doctor if you are having problems. It's also a good idea to know your test results and keep a list of the medicines you take. When should you call for help? Call 911 anytime you think you may need emergency care. For example, call if:    · You passed out (lost consciousness).     · You have chest pain, are short of breath, or cough up blood.     · You are unable to move an arm or a leg at all.   [facility] your doctor now or seek immediate medical care if:    · You have pain that does not get better after you take pain medicine.     · You have loose stitches, or your incision comes open.     · Bright red blood has soaked through the bandage over your incision.     · You have signs of a blood clot in your leg (called a deep vein thrombosis), such as:  ? Pain in your calf, back of the knee, thigh, or groin. ? Redness or swelling in your leg.     · You have signs of infection, such as:  ? Increased pain, swelling, warmth, or redness. ? Red streaks leading from the incision. ? Pus draining from the incision. ? A fever.     · You have new or worse symptoms in your arms, legs, chest, belly, or buttocks. Symptoms may include:  ? Numbness or tingling. ? Weakness. ? Pain.     · You lose bladder or bowel control.    Watch closely for any changes in your health, and be sure to contact your doctor if:    · You do not get better as expected. Where can you learn more?   Go to http://rachel-omid.info/. Enter M285 in the search box to learn more about \"Cervical Spinal Fusion: What to Expect at Home. \"  Current as of: September 20, 2018  Content Version: 12.1  © 4099-3481 [employer]. Care instructions adapted under license by [employer] (which disclaims liability or warranty for this information). If you have questions about a medical condition or this instruction, always ask your healthcare professional. [address] any warranty or liability for your use of this information.

## 2019-08-22 NOTE — TELEPHONE ENCOUNTER
Called and spoke w/ pt regarding EUGENIE, she only had 12cc in the last 12 hours. She is on her way to [facility] to get it taken out.

## 2019-08-22 NOTE — PROGRESS NOTES
[address]  [address], [address]  [place]  Phone: [phone]  Fax: [phone]    Spine Post-Op Office Visit Note    Patient: Easton Romero   MRN: [id number]     Age:  63 y.o.,      Sex: female    YOB: 1960     PCP: Sundeep Diaz MD    HISTORY OF PRESENT ILLNESS:  Chief Complaint   Patient presents with    Surgical Follow-up     Easton Romero is a 63 y.o.  female with history of cervical pain. Patient had ACDF C5/6 surgery 2 days ago. Prior to surgery, Patient had a onset of severe neck and radiating right arm pain in a classic C6 distribution in her neck, biceps, and to her thumb.  It steadily worsens despite physical therapy, medications, and time. She is here today to have her COMPA drain removed. She feels much improved. Her arm pain is totally resolved. She has incisional pain. She did trip in the office but did not fall or hit anything. There is nothing in her COMPA at this time. She emptied 12 mL over night. She feels great. Denies any bladder/bowel dysfunction, new onset weakness, or other neurological deficits. ASSESSMENT   Diagnoses and all orders for this visit:    1. S/P cervical spinal fusion    2. HNP (herniated nucleus pulposus), cervical            IMPRESSION AND PLAN   1) Pt was given information on s/p cervical fusion. 2) Compa removed. Pressure applied for 3 minutes, no drainage at all. Clean dressing applied. 3) Wound care and activity level reviewed. 4) Ms. Milan Rodriguez has a reminder for a \"due or due soon\" health maintenance. I have asked that she contact her primary care provider, Sundeep Diaz MD, for follow-up on this health maintenance. 5) We have informed the patient to notify us for immediate appointment if he has any worsening neurogical symptoms or if an emergency situation presents, then call 911  6)  demonstrated consistency with prescribing. 7) Pt will follow-up in as scheduled. SUBJECTIVE    Pain Scale: 1/10    Pain Assessment  2019   Location of Pain Neck   Location Modifiers -   Severity of Pain 1   Quality of Pain Dull   Duration of Pain Persistent   Frequency of Pain Constant   Aggravating Factors -   Limiting Behavior -   Relieving Factors -   Result of Injury -         Review of systems  Constitutional: Negative for fever, chills, or weight change. Respiratory: Negative for cough or shortness of breath. Cardiovascular: Negative for chest pain or palpitations. Gastrointestinal: Negative for acid reflux, change in bowel habits, or constipation. Genitourinary: Negative for dysuria and flank pain. Musculoskeletal: Positive for incisional pain. Skin: Negative for rash. Neurological: Negative for headaches, dizziness, or numbness. Endo/Heme/Allergies: Negative for increased bruising. Psychiatric/Behavioral: Negative for difficulty with sleep. Past Medical History:   Diagnosis Date    Hypertension     Ill-defined condition 2008    sub arachnoid hemorrhage       Past Surgical History:   Procedure Laterality Date    HX BREAST REDUCTION Bilateral     HX  SECTION      HX  SECTION      HX CHOLECYSTECTOMY      HX GASTRIC BYPASS      HX GYN  1993    uterus and cervix removed    HX HYSTERECTOMY      HX OOPHORECTOMY      HX ORTHOPAEDIC Right 2000    \"wrist surgically fixed\" per patient    [address]    \"Most of Thyroid removed\" per patient.  HX TONSILLECTOMY  1965    NEUROLOGICAL PROCEDURE UNLISTED       Anterior cervical decompression and fusion, C5-6; structural allograft x1; anterior cervical plate fixation at M6-6 with K2M anterior cervical locking plate. Current Outpatient Medications   Medication Sig Dispense Refill    HYDROmorphone (DILAUDID) 2 mg tablet Take 1 Tab by mouth every six (6) hours as needed for Pain for up to 14 days.  Max Daily Amount: 8 mg. 20 Tab 0    ergocalciferol (ERGOCALCIFEROL) 50,000 unit capsule Take 1 Cap by mouth every seven (7) days.  lisinopril (PRINIVIL, ZESTRIL) 10 mg tablet Take 1 Tab by mouth daily.  loratadine (CLARITIN) 10 mg tablet Take 1 Tab by mouth daily.  metroNIDAZOLE (METROGEL) 1 % topical gel Apply  to affected area daily. Use a thin layer to affected areas after washing      acetaminophen (TYLENOL EXTRA STRENGTH) 500 mg tablet Take  by mouth every six (6) hours as needed for Pain.  methocarbamol (ROBAXIN) 500 mg tablet Take 1 tab by mouth BID-TID as needed for muscle spasms  Indications: muscle spasm 90 Tab 3    gabapentin (NEURONTIN) 300 mg capsule Take 1 cap by mouth in the morning and 2 at night as directed. 90 Cap 1       Allergies   Allergen Reactions    Codeine Anaphylaxis    Cranberry Anaphylaxis    Morphine Anaphylaxis    Nsaids (Non-Steroidal Anti-Inflammatory Drug) Other (comments)     Gastric Bypass Surgery in the Past    Strawberry Anaphylaxis            OBJECTIVE    Vitals:    08/22/19 0944   BP: 103/58   Pulse: 76   Resp: 18   Temp: 97.7 °F (36.5 °C)   TempSrc: Oral   Weight: 239 lb (108.4 kg)   Height: 5' 11\" (1.803 m)   PainSc:   1   PainLoc: Neck       Physical Exam:  General: alert, cooperative, no distress, appears stated age  Constitutional:  Well developed, well nourished, in no acute distress. Psychiatric: Affect and mood are appropriate. Integumentary: No rashes or abrasions noted on exposed areas. Wound: Incision healing well, has well approximated edges, no erythema, warmth, drainage or signs of infection. Glue in place. Cardiovascular/Peripheral Vascular: No peripheral edema is noted. Lymphatic:  No evidence of lymphedema. No cervical lymphadenopathy. MOTOR    Biceps  Triceps Deltoids Wrist Ext Wrist Flex Hand Intrin   Right +4/5 +4/5 +4/5 +4/5 +4/5 +4/5   Left +4/5 +4/5 +4/5 +4/5 +4/5 +4/5       normal gait and station      Ambulation without assistive device.  full weight bearing, non-antalgic gait. Accompanied by family member.       Sheryl Kumari NP  August 22, 2019  9:49 AM

## 2019-08-23 PROCEDURE — 3331090002 HH PPS REVENUE DEBIT

## 2019-08-23 PROCEDURE — 3331090001 HH PPS REVENUE CREDIT

## 2019-08-24 PROCEDURE — 3331090001 HH PPS REVENUE CREDIT

## 2019-08-24 PROCEDURE — 3331090002 HH PPS REVENUE DEBIT

## 2019-08-25 PROCEDURE — 3331090002 HH PPS REVENUE DEBIT

## 2019-08-25 PROCEDURE — 3331090001 HH PPS REVENUE CREDIT

## 2019-08-26 PROCEDURE — 3331090002 HH PPS REVENUE DEBIT

## 2019-08-26 PROCEDURE — 3331090001 HH PPS REVENUE CREDIT

## 2019-08-27 PROCEDURE — 3331090001 HH PPS REVENUE CREDIT

## 2019-08-27 PROCEDURE — 3331090002 HH PPS REVENUE DEBIT

## 2019-08-28 PROCEDURE — 3331090001 HH PPS REVENUE CREDIT

## 2019-08-28 PROCEDURE — 3331090002 HH PPS REVENUE DEBIT

## 2019-08-29 ENCOUNTER — TELEPHONE (OUTPATIENT)
Dept: ORTHOPEDIC SURGERY | Age: 59
End: 2019-08-29

## 2019-08-29 DIAGNOSIS — M50.20 HNP (HERNIATED NUCLEUS PULPOSUS), CERVICAL: ICD-10-CM

## 2019-08-29 PROCEDURE — 3331090001 HH PPS REVENUE CREDIT

## 2019-08-29 PROCEDURE — 3331090002 HH PPS REVENUE DEBIT

## 2019-08-29 RX ORDER — HYDROMORPHONE HYDROCHLORIDE 2 MG/1
2 TABLET ORAL
Qty: 15 TAB | Refills: 0 | Status: SHIPPED | OUTPATIENT
Start: 2019-08-29 | End: 2019-09-05

## 2019-08-29 NOTE — TELEPHONE ENCOUNTER
rx printed   Call pt for         Tapering to 1 tab every 8 hours PRN, this rx should last her until her fu on 9/4. Our goal is to wean down and off this medication.

## 2019-08-29 NOTE — TELEPHONE ENCOUNTER
Patient is requesting a refill on HYDROmorphone (DILAUDID) 2 mg. Please advise and pend new Rx if appropriate.     Last visit:  8/22/19 with MALCOLM Brown *post-op 8/21/19 with MD Felix Ferraro*  Next appt:  9/4/19 with MALCOLM Brown  Previous refill encounter:  8/21/19 #20    Patient's phone # [phone]

## 2019-08-30 PROCEDURE — 3331090002 HH PPS REVENUE DEBIT

## 2019-08-30 PROCEDURE — 3331090001 HH PPS REVENUE CREDIT

## 2019-08-31 PROCEDURE — 3331090002 HH PPS REVENUE DEBIT

## 2019-08-31 PROCEDURE — 3331090001 HH PPS REVENUE CREDIT

## 2019-09-01 PROCEDURE — 3331090002 HH PPS REVENUE DEBIT

## 2019-09-01 PROCEDURE — 3331090001 HH PPS REVENUE CREDIT

## 2019-09-02 PROCEDURE — 3331090001 HH PPS REVENUE CREDIT

## 2019-09-02 PROCEDURE — 3331090002 HH PPS REVENUE DEBIT

## 2019-09-03 PROCEDURE — 3331090002 HH PPS REVENUE DEBIT

## 2019-09-03 PROCEDURE — 3331090001 HH PPS REVENUE CREDIT

## 2019-09-04 ENCOUNTER — OFFICE VISIT (OUTPATIENT)
Dept: ORTHOPEDIC SURGERY | Age: 59
End: 2019-09-04

## 2019-09-04 VITALS
TEMPERATURE: 98.3 F | HEART RATE: 98 BPM | RESPIRATION RATE: 14 BRPM | HEIGHT: 70 IN | BODY MASS INDEX: 33.27 KG/M2 | OXYGEN SATURATION: 96 % | DIASTOLIC BLOOD PRESSURE: 69 MMHG | WEIGHT: 232.4 LBS | SYSTOLIC BLOOD PRESSURE: 94 MMHG

## 2019-09-04 DIAGNOSIS — Z98.1 S/P CERVICAL SPINAL FUSION: Primary | ICD-10-CM

## 2019-09-04 DIAGNOSIS — M50.20 HNP (HERNIATED NUCLEUS PULPOSUS), CERVICAL: ICD-10-CM

## 2019-09-04 DIAGNOSIS — M54.12 RIGHT CERVICAL RADICULOPATHY: ICD-10-CM

## 2019-09-04 PROCEDURE — 3331090002 HH PPS REVENUE DEBIT

## 2019-09-04 PROCEDURE — 3331090001 HH PPS REVENUE CREDIT

## 2019-09-04 NOTE — PROGRESS NOTES
[address]  [address], [address]  [place]  Phone: [phone]  Fax: [phone]    Spine Post-Op Office Visit Note    Patient: Ingris Obrien   MRN: [id number]     Age:  63 y.o.,      Sex: female    YOB: 1960     PCP: Andreas Rendon MD    HISTORY OF PRESENT ILLNESS:  No chief complaint on file. Ingris Obrien is a 63 y.o.  female with history of cervical pain. Patient had ACDF C5/6 surgery 2 weeks ago. Prior to surgery, she had  about five or six months ago, she had an onset of severe neck and radiating right arm pain in a classic C6 distribution in her neck, biceps, and to her thumb. Today, she is doing ok. Her voice is still a little horse. She is swallowing ok. Her pain is ok, it is much better then before. She states the arm is doing well. Some times she has no pain, others mild. She stopped Dilaudid. She is taking Tylenol. Patient denies any bladder/bowel dysfunction, new onset weakness, or other neurological deficits. She will remain OOW until her post op visit. She works [employer].     ASSESSMENT   Diagnoses and all orders for this visit:    1. S/P cervical spinal fusion    2. HNP (herniated nucleus pulposus), cervical    3. Right cervical radiculopathy            IMPRESSION AND PLAN   1) Pt was given information on s/p cervical fusion. 2) Tylenol PRN for pain. 3) Wound care and activity level reviewed. 4) Ms. Arcelia Child has a reminder for a \"due or due soon\" health maintenance. I have asked that she contact her primary care provider, Andreas Rendon MD, for follow-up on this health maintenance. 5) We have informed the patient to notify us for immediate appointment if he has any worsening neurogical symptoms or if an emergency situation presents, then call 911  6)  demonstrated consistency with prescribing. 7) Pt will follow-up in as scheduled. 8) OOW until FU.               SUBJECTIVE    Pain Scale: /10    Pain Assessment  2019   Location of Pain Neck   Location Modifiers -   Severity of Pain 1   Quality of Pain Dull   Duration of Pain Persistent   Frequency of Pain Constant   Aggravating Factors -   Limiting Behavior -   Relieving Factors -   Result of Injury -     Review of systems  Constitutional: Negative for fever, chills, or weight change. Respiratory: Negative for cough or shortness of breath. Cardiovascular: Negative for chest pain or palpitations. Gastrointestinal: Negative for acid reflux, change in bowel habits, or constipation. Genitourinary: Negative for dysuria and flank pain. Musculoskeletal: Positive for mild incisional pain. Skin: Negative for rash. Neurological: Negative for headaches, dizziness, or numbness. Endo/Heme/Allergies: Negative for increased bruising. Psychiatric/Behavioral: Negative for difficulty with sleep. Past Medical History:   Diagnosis Date    Hypertension     Ill-defined condition     sub arachnoid hemorrhage       Past Surgical History:   Procedure Laterality Date    HX BREAST REDUCTION Bilateral     HX  SECTION      HX  SECTION      HX CHOLECYSTECTOMY      HX GASTRIC BYPASS      HX GYN  1993    uterus and cervix removed    HX HYSTERECTOMY      HX OOPHORECTOMY  2006    HX ORTHOPAEDIC Right 2000    \"wrist surgically fixed\" per patient    [address]    \"Most of Thyroid removed\" per patient.  HX TONSILLECTOMY  1965    NEUROLOGICAL PROCEDURE UNLISTED       Anterior cervical decompression and fusion, C5-6; structural allograft x1; anterior cervical plate fixation at M1-2 with K2M anterior cervical locking plate. Current Outpatient Medications   Medication Sig Dispense Refill    HYDROmorphone (DILAUDID) 2 mg tablet Take 1 Tab by mouth every eight (8) hours as needed for Pain for up to 7 days. Max Daily Amount: 6 mg.  Indications: post op pain 15 Tab 0    phenazopyridine (PYRIDIUM) 200 mg tablet Take 200 mg by mouth three (3) times daily.  ergocalciferol (ERGOCALCIFEROL) 50,000 unit capsule Take 1 Cap by mouth every seven (7) days.  lisinopril (PRINIVIL, ZESTRIL) 10 mg tablet Take 1 Tab by mouth daily.  loratadine (CLARITIN) 10 mg tablet Take 1 Tab by mouth daily.  metroNIDAZOLE (METROGEL) 1 % topical gel Apply  to affected area daily. Use a thin layer to affected areas after washing      acetaminophen (TYLENOL EXTRA STRENGTH) 500 mg tablet Take  by mouth every six (6) hours as needed for Pain.  methocarbamol (ROBAXIN) 500 mg tablet Take 1 tab by mouth BID-TID as needed for muscle spasms  Indications: muscle spasm (Patient taking differently: Take 500 mg by mouth three (3) times daily. Take 1 tab by mouth BID-TID as needed for muscle spasms  Indications: muscle spasm) 90 Tab 3    gabapentin (NEURONTIN) 300 mg capsule Take 1 cap by mouth in the morning and 2 at night as directed. 90 Cap 1       Allergies   Allergen Reactions    Codeine Anaphylaxis    Cranberry Anaphylaxis    Morphine Anaphylaxis    Nsaids (Non-Steroidal Anti-Inflammatory Drug) Other (comments)     Gastric Bypass Surgery in the Past    Strawberry Anaphylaxis            OBJECTIVE    There were no vitals filed for this visit. Physical Exam:  General: alert, cooperative, no distress, appears stated age  Constitutional:  Well developed, well nourished, in no acute distress. Psychiatric: Affect and mood are appropriate. Integumentary: No rashes or abrasions noted on exposed areas. Wound: Incision healing well, has well approximated edges, no erythema, warmth, drainage or signs of infection. Cardiovascular/Peripheral Vascular: No peripheral edema is noted. Lymphatic:  No evidence of lymphedema. No cervical lymphadenopathy.      MOTOR    Biceps  Triceps Deltoids Wrist Ext Wrist Flex Hand Intrin   Right +4/5 +4/5 +4/5 +4/5 +4/5 +4/5   Left +4/5 +4/5 +4/5 +4/5 +4/5 +4/5       normal gait and station    Ambulation without assistive device. full weight bearing, non-antalgic gait. Accompanied by family member.       Silvana Lomeli NP  September 4, 2019  3:04 PM

## 2019-09-04 NOTE — PATIENT INSTRUCTIONS
Cervical Spinal Fusion: What to Expect at Home  Your Recovery    After surgery, you can expect your neck to feel stiff and sore. This should improve in the weeks after surgery. You may have trouble sitting or standing in one position for very long and may need pain medicine in the weeks after your surgery. You may need to wear a neck brace for a while. It may take 4 to 6 weeks to get back to your usual activities, but it may depend on what kind of surgery you had. Your doctor may advise you to work with a physical therapist to strengthen the muscles around your neck and back. This care sheet gives you a general idea about how long it will take for you to recover. But each person recovers at a different pace. Follow the steps below to get better as quickly as possible. How can you care for yourself at home? Activity    · Rest when you feel tired. Getting enough sleep will help you recover.     · Try to walk each day. Start by walking a little more than you did the day before. Bit by bit, increase the amount you walk. Walking boosts blood flow and helps prevent pneumonia and constipation. Walking may also decrease your muscle soreness after surgery.     · Follow your doctor's directions about not lifting anything that would strain your neck and back. This may include a child, heavy grocery bags and milk containers, a heavy briefcase or backpack, cat litter or dog food bags, or a vacuum .     · Avoid strenuous activities, such as bicycle riding, jogging, weightlifting, or aerobic exercise, until your doctor says it is okay.     · Do not drive for 2 to 4 weeks after your surgery or until your doctor says it is okay.     · Avoid taking long car trips for 2 to 4 weeks after surgery. Your neck may become tired and painful from sitting too long in one position.     · You will probably need to take 4 to 6 weeks off from work.  It depends on the type of work you do and how you feel.     · You may have sex as soon as you feel able, but avoid positions that put stress on your neck or cause pain. Diet    · You can eat your normal diet. If your stomach is upset, try bland, low-fat foods like plain rice, broiled chicken, toast, and yogurt.     · Drink plenty of fluids. If you have kidney, heart, or liver disease and have to limit fluids, talk with your doctor before you increase the amount of fluids you drink.     · You may notice that your bowel movements are not regular right after your surgery. This is common. Try to avoid constipation and straining with bowel movements. You may want to take a fiber supplement every day. If you have not had a bowel movement after a couple of days, ask your doctor about taking a mild laxative. Medicines    · Your doctor will tell you if and when you can restart your medicines. He or she will also give you instructions about taking any new medicines.     · If you take blood thinners, such as warfarin (Coumadin), clopidogrel (Plavix), or aspirin, be sure to talk to your doctor. He or she will tell you if and when to start taking those medicines again. Make sure that you understand exactly what your doctor wants you to do.     · Be safe with medicines. Take pain medicines exactly as directed. ? If the doctor gave you a prescription medicine for pain, take it as prescribed. ? If you are not taking a prescription pain medicine, ask your doctor if you can take an over-the-counter medicine.     · If your doctor prescribed antibiotics, take them as directed. Do not stop taking them just because you feel better. You need to take the full course of antibiotics.     · If you think your pain pill is making you sick to your stomach:  ? Take your pills after meals (unless your doctor has told you not to). ? Ask your doctor for a different pain pill. Incision care    · You will be given specific instructions about how to care for the cut (incision) the doctor made.  The instructions will depend on the type of materials used to close the cut. Exercise    · Do exercises as instructed by your doctor or physical therapist to improve your strength and flexibility. Other instructions    · To reduce stiffness and help sore muscles, use a warm water bottle, a heating pad set on low, or a warm cloth on your neck. Do not put heat right over the incision. Do not go to sleep with a heating pad on your skin. Follow-up care is a key part of your treatment and safety. Be sure to make and go to all appointments, and call your doctor if you are having problems. It's also a good idea to know your test results and keep a list of the medicines you take. When should you call for help? Call 911 anytime you think you may need emergency care. For example, call if:    · You passed out (lost consciousness).     · You have chest pain, are short of breath, or cough up blood.     · You are unable to move an arm or a leg at all.   [facility] your doctor now or seek immediate medical care if:    · You have pain that does not get better after you take pain medicine.     · You have loose stitches, or your incision comes open.     · Bright red blood has soaked through the bandage over your incision.     · You have signs of a blood clot in your leg (called a deep vein thrombosis), such as:  ? Pain in your calf, back of the knee, thigh, or groin. ? Redness or swelling in your leg.     · You have signs of infection, such as:  ? Increased pain, swelling, warmth, or redness. ? Red streaks leading from the incision. ? Pus draining from the incision. ? A fever.     · You have new or worse symptoms in your arms, legs, chest, belly, or buttocks. Symptoms may include:  ? Numbness or tingling. ? Weakness. ? Pain.     · You lose bladder or bowel control.    Watch closely for any changes in your health, and be sure to contact your doctor if:    · You do not get better as expected. Where can you learn more?   Go to http://raeann.info/. Enter U457 in the search box to learn more about \"Cervical Spinal Fusion: What to Expect at Home. \"  Current as of: September 20, 2018  Content Version: 12.1  © 8827-2911 [employer]. Care instructions adapted under license by [employer] (which disclaims liability or warranty for this information). If you have questions about a medical condition or this instruction, always ask your healthcare professional. [address] any warranty or liability for your use of this information.

## 2019-09-05 PROCEDURE — 3331090002 HH PPS REVENUE DEBIT

## 2019-09-05 PROCEDURE — 3331090001 HH PPS REVENUE CREDIT

## 2019-09-06 PROCEDURE — 3331090001 HH PPS REVENUE CREDIT

## 2019-09-06 PROCEDURE — 3331090002 HH PPS REVENUE DEBIT

## 2019-09-07 PROCEDURE — 3331090002 HH PPS REVENUE DEBIT

## 2019-09-07 PROCEDURE — 3331090001 HH PPS REVENUE CREDIT

## 2019-09-08 PROCEDURE — 3331090001 HH PPS REVENUE CREDIT

## 2019-09-08 PROCEDURE — 3331090002 HH PPS REVENUE DEBIT

## 2019-09-09 ENCOUNTER — HOME CARE VISIT (OUTPATIENT)
Dept: HOME HEALTH SERVICES | Facility: HOME HEALTH | Age: 59
End: 2019-09-09
Payer: OTHER GOVERNMENT

## 2019-09-09 PROCEDURE — 3331090003 HH PPS REVENUE ADJ

## 2019-09-09 PROCEDURE — 3331090001 HH PPS REVENUE CREDIT

## 2019-09-09 PROCEDURE — 3331090002 HH PPS REVENUE DEBIT

## 2019-09-19 ENCOUNTER — TELEPHONE (OUTPATIENT)
Dept: ORTHOPEDIC SURGERY | Age: 59
End: 2019-09-19

## 2019-09-19 ENCOUNTER — OFFICE VISIT (OUTPATIENT)
Dept: ORTHOPEDIC SURGERY | Age: 59
End: 2019-09-19

## 2019-09-19 VITALS
SYSTOLIC BLOOD PRESSURE: 94 MMHG | HEART RATE: 86 BPM | OXYGEN SATURATION: 95 % | DIASTOLIC BLOOD PRESSURE: 72 MMHG | HEIGHT: 70 IN | RESPIRATION RATE: 16 BRPM | BODY MASS INDEX: 33.56 KG/M2 | TEMPERATURE: 98.1 F | WEIGHT: 234.4 LBS

## 2019-09-19 DIAGNOSIS — Z98.1 S/P CERVICAL SPINAL FUSION: Primary | ICD-10-CM

## 2019-09-19 RX ORDER — CEPHALEXIN 500 MG/1
500 CAPSULE ORAL 3 TIMES DAILY
Qty: 21 CAP | Refills: 0 | Status: SHIPPED | OUTPATIENT
Start: 2019-09-19 | End: 2019-10-04 | Stop reason: ALTCHOICE

## 2019-09-19 NOTE — PROGRESS NOTES
[address]  [address], [address]  [place]  Phone: [phone]  Fax: [phone]    Spine Post-Op Office Visit Note    Patient: Delbert Gunn   MRN: [id number]     Age:  63 y.o.,      Sex: female    YOB: 1960     PCP: Haley Mcghee MD    HISTORY OF PRESENT ILLNESS:  Chief Complaint   Patient presents with    Neck Pain     follow up due to feeling like a stitch is still in neck incision   Delbert Gunn is a 63 y.o.  female with history of cervical pain. Patient had ACDF C5/6 surgery about4  weeks ago. Prior to surgery, she had  about five or six months ago, she had an onset of severe neck and radiating right arm pain in a classic C6 distribution in her neck, biceps, and to her thumb. at her 2 week visit, she was doing ok. Her voice was still a little horse. She was swallowing ok. Her pain was ok, it is much better then before. She stated the arm is doing well. Some times she has no pain, others mild. She stopped Dilaudid. She was taking Tylenol. She is here today for a wound check. There is a slight opening at the right end of the incision. stitch spitting out. Patient feels fine, no fever, does not feel sick. No drainage. Patient denies any bladder/bowel dysfunction, new onset weakness, or other neurological deficits.      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 ASSESSMENT   Diagnoses and all orders for this visit:    1. S/P cervical spinal fusion    Other orders  -     cephALEXin (KEFLEX) 500 mg capsule; Take 1 Cap by mouth three (3) times daily. Indications: skin infection            IMPRESSION AND PLAN   1) Pt was given information on neck fusion. 2)  One week of keflex for slight wound opening. stitch clipped off at the skin. Denies fever, feeling sickly. 3) RTW on 9/25 for 4 hours a day from home  3) Wound care and activity level reviewed.    4) MsWendy Stephanie Bradshaw has a reminder for a \"due or due soon\" health maintenance. I have asked that she contact her primary care provider, Aditi Gamez MD, for follow-up on this health maintenance. 5) We have informed the patient to notify us for immediate appointment if he has any worsening neurogical symptoms or if an emergency situation presents, then call 911  6)  demonstrated consistency with prescribing. 7) Pt will follow-up in as scheduled. 8) Called PCP Dr. Williamson, patient BP has been running low since surgery. 94/69, 93/59, 94/72. She does feel light headed when she stands. She is drinking plenty of fluids per the patient. We are attemptint to contact Dr. Williamson now. We are unable to get through as this is a [facility]. We have left a VM. Suggest patient send a message via patient portal and also drive over to have this issue addressed. If she can not get through, see urgent care for BP medication adjustment. SUBJECTIVE    Pain Scale: 0 - No pain/10    Pain Assessment  9/19/2019   Location of Pain Neck   Location Modifiers -   Severity of Pain 0   Quality of Pain -   Duration of Pain -   Frequency of Pain -   Aggravating Factors -   Limiting Behavior -   Relieving Factors -   Result of Injury -       Review of systems  Constitutional: Negative for fever, chills, or weight change. Respiratory: Negative for cough or shortness of breath. Cardiovascular: Negative for chest pain or palpitations. Gastrointestinal: Negative for acid reflux, change in bowel habits, or constipation. Genitourinary: Negative for dysuria and flank pain. Musculoskeletal: Negative for pain. Skin: Negative for rash. Neurological: Negative for headaches, dizziness, or numbness. Endo/Heme/Allergies: Negative for increased bruising. Psychiatric/Behavioral: Negative for difficulty with sleep.            Past Medical History:   Diagnosis Date    Hypertension     Ill-defined condition 2008    sub arachnoid hemorrhage       Past Surgical History: Procedure Laterality Date    HX BREAST REDUCTION Bilateral     HX CERVICAL DISKECTOMY  2019    ACDF C5/6    HX CERVICAL FUSION  2019    ACDF C5/6    HX  SECTION      HX  SECTION      HX CHOLECYSTECTOMY      HX GASTRIC BYPASS      HX GYN  1993    uterus and cervix removed    HX HYSTERECTOMY      HX OOPHORECTOMY      HX ORTHOPAEDIC Right 2000    \"wrist surgically fixed\" per patient    [address]    \"Most of Thyroid removed\" per patient.  HX TONSILLECTOMY  1965    NEUROLOGICAL PROCEDURE UNLISTED       Anterior cervical decompression and fusion, C5-6; structural allograft x1; anterior cervical plate fixation at R6-4 with K2M anterior cervical locking plate. Current Outpatient Medications   Medication Sig Dispense Refill    cephALEXin (KEFLEX) 500 mg capsule Take 1 Cap by mouth three (3) times daily. Indications: skin infection 21 Cap 0    ergocalciferol (ERGOCALCIFEROL) 50,000 unit capsule Take 1 Cap by mouth every seven (7) days.  lisinopril (PRINIVIL, ZESTRIL) 10 mg tablet Take 1 Tab by mouth daily.  loratadine (CLARITIN) 10 mg tablet Take 1 Tab by mouth daily.  metroNIDAZOLE (METROGEL) 1 % topical gel Apply  to affected area daily. Use a thin layer to affected areas after washing      acetaminophen (TYLENOL EXTRA STRENGTH) 500 mg tablet Take  by mouth every six (6) hours as needed for Pain.  methocarbamol (ROBAXIN) 500 mg tablet Take 1 tab by mouth BID-TID as needed for muscle spasms  Indications: muscle spasm (Patient taking differently: Take 500 mg by mouth three (3) times daily. Take 1 tab by mouth BID-TID as needed for muscle spasms  Indications: muscle spasm) 90 Tab 3    gabapentin (NEURONTIN) 300 mg capsule Take 1 cap by mouth in the morning and 2 at night as directed. 90 Cap 1    phenazopyridine (PYRIDIUM) 200 mg tablet Take 200 mg by mouth three (3) times daily.          Allergies   Allergen Reactions    Codeine Anaphylaxis    Cranberry Anaphylaxis    Morphine Anaphylaxis    Nsaids (Non-Steroidal Anti-Inflammatory Drug) Other (comments)     Gastric Bypass Surgery in the Past    Strawberry Anaphylaxis            OBJECTIVE    Vitals:    09/19/19 1116   BP: 94/72   Pulse: 86   Resp: 16   Temp: 98.1 °F (36.7 °C)   TempSrc: Oral   SpO2: 95%   Weight: 234 lb 6.4 oz (106.3 kg)   Height: 5' 10\" (1.778 m)   PainSc:   0 - No pain   PainLoc: Neck       Physical Exam:  General: alert, cooperative, no distress, appears stated age  Constitutional:  Well developed, well nourished, in no acute distress. Psychiatric: Affect and mood are appropriate. Integumentary: No rashes or abrasions noted on exposed areas. Wound: Incision healing well, has well approximated edges, no erythema, warmth, drainage or signs of infection except there is a slight opening at the right end of the incision. stitch spitting out. Cardiovascular/Peripheral Vascular: No peripheral edema is noted. Lymphatic:  No evidence of lymphedema. No cervical lymphadenopathy. MOTOR     Biceps  Triceps Deltoids Wrist Ext Wrist Flex Hand Intrin   Right +4/5 +4/5 +4/5 +4/5 +4/5 +4/5   Left +4/5 +4/5 +4/5 +4/5 +4/5 +4/5         normal gait and station     Ambulation without assistive device. full weight bearing, non-antalgic gait.     Accompanied by family member.       Alexus Shepherd NP  September 19, 2019  11:01 AM

## 2019-09-19 NOTE — LETTER
NOTIFICATION OF RETURN TO WORK / SCHOOL 
 
9/19/2019 11:29 AM 
 
Ms. [id number] [facility] [address] [id number] Arielle Lundberg To Whom It May Concern: 
 
Vani Ureña is under the care of [address] [facility] She will be able to work from home starting 9/25/19 for 4 hours a day. If there are questions or concerns please have the patient contact our office. Sincerely, Candi Segura NP

## 2019-09-19 NOTE — TELEPHONE ENCOUNTER
Patient called stating that she believes she has a stitch coming out of her incision on her neck. She is not sure if she can pull it out or if she needs to come in.  Please advise patient [phone]

## 2019-10-04 ENCOUNTER — OFFICE VISIT (OUTPATIENT)
Dept: ORTHOPEDIC SURGERY | Age: 59
End: 2019-10-04

## 2019-10-04 VITALS
RESPIRATION RATE: 20 BRPM | SYSTOLIC BLOOD PRESSURE: 136 MMHG | HEART RATE: 87 BPM | WEIGHT: 235.4 LBS | DIASTOLIC BLOOD PRESSURE: 88 MMHG | TEMPERATURE: 98.1 F | BODY MASS INDEX: 33.78 KG/M2 | OXYGEN SATURATION: 99 %

## 2019-10-04 DIAGNOSIS — Z98.1 S/P CERVICAL SPINAL FUSION: Primary | ICD-10-CM

## 2019-10-04 DIAGNOSIS — M50.20 HNP (HERNIATED NUCLEUS PULPOSUS), CERVICAL: ICD-10-CM

## 2019-10-04 NOTE — LETTER
10/4/2019 8:44 AM 
 
Ms. [id number] [place]  [address] [id number] To Whom It May Concern: 
 
Gaetano Gray is currently under the care of [address] [facility]. Please allow her to work from home until 10/11/19. After that she may return to normal work hours. If there are questions or concerns please have the patient contact our office.  
 
 
 
Sincerely, 
 
 
 
 
Neno Nolasco MD

## 2019-10-04 NOTE — PROGRESS NOTES
Roberto Jamesjovan presents today for   Chief Complaint   Patient presents with    Surgical Follow-up     Neck       Is someone accompanying this pt? No    Is the patient using any DME equipment during OV? No    Depression Screening:  3 most recent PHQ Screens 10/4/2019   Little interest or pleasure in doing things Not at all   Feeling down, depressed, irritable, or hopeless Not at all   Total Score PHQ 2 0       Learning Assessment:  Learning Assessment 10/4/2019   PRIMARY LEARNER Patient   PRIMARY LANGUAGE ENGLISH   LEARNER PREFERENCE PRIMARY LISTENING   ANSWERED BY Patient   RELATIONSHIP SELF       Abuse Screening:  Abuse Screening Questionnaire 10/4/2019   Do you ever feel afraid of your partner? N   Are you in a relationship with someone who physically or mentally threatens you? N   Is it safe for you to go home? Y       Fall Risk  Fall Risk Assessment, last 12 mths 10/4/2019   Able to walk? Yes   Fall in past 12 months? No         Coordination of Care:  1. Have you been to the ER, urgent care clinic since your last visit? No  Hospitalized since your last visit? No    2. Have you seen or consulted any other health care providers outside of the [address] [place] [address] since your last visit? No Include any pap smears or colon screening.  No

## 2019-10-04 NOTE — PROGRESS NOTES
[address]  [address], [address]  [place]  Phone: [phone]  Fax: [phone]  PROGRESS NOTE  Patient: Martin Parra                MRN: [id number]       SSN: [id number]  YOB: 1960        AGE: 63 y.o. SEX: female  Body mass index is 33.78 kg/m². PCP: Shalonda Shipley MD  10/04/19    Chief Complaint   Patient presents with    Surgical Follow-up     Neck       HISTORY OF PRESENT ILLNESS, RADIOGRAPHS, and PLAN:     HISTORY OF PRESENT ILLNESS:  Ms. Dixie Garcia returns today. She is six weeks out from her cervical decompression and fusion. She is neurologically intact. her wound is healed and dry. She rates her pain as a 1/10. The only time she gets some pain is when she places her arm behind her back and presses down with it in a fully externally rotated position. I think it is related more to shoulder stiffness. ASSESSMENT/PLAN: I offered her therapy. She would like just to work through it on her own. We will see her back in another six weeks and allow her home work activities for another week and then go back to the office. I will see her back again at that point for routine followup. I am pleased by her outcome. cc: Ludy Swanson M.D. Past Medical History:   Diagnosis Date    Hypertension     Ill-defined condition 2008    sub arachnoid hemorrhage       Family History   Problem Relation Age of Onset    Osteoporosis Mother     Heart Disease Mother     Arrhythmia Mother     Hypertension Mother     Thyroid Disease Father     Stroke Father     Other Father         Rheumatic Fever    Hypertension Father        Current Outpatient Medications   Medication Sig Dispense Refill    phenazopyridine (PYRIDIUM) 200 mg tablet Take 200 mg by mouth three (3) times daily.  loratadine (CLARITIN) 10 mg tablet Take 1 Tab by mouth daily.       metroNIDAZOLE (METROGEL) 1 % topical gel Apply  to affected area daily. Use a thin layer to affected areas after washing      acetaminophen (TYLENOL EXTRA STRENGTH) 500 mg tablet Take  by mouth every six (6) hours as needed for Pain.  methocarbamol (ROBAXIN) 500 mg tablet Take 1 tab by mouth BID-TID as needed for muscle spasms  Indications: muscle spasm (Patient taking differently: Take 500 mg by mouth three (3) times daily. Take 1 tab by mouth BID-TID as needed for muscle spasms  Indications: muscle spasm) 90 Tab 3    gabapentin (NEURONTIN) 300 mg capsule Take 1 cap by mouth in the morning and 2 at night as directed. 90 Cap 1    ergocalciferol (ERGOCALCIFEROL) 50,000 unit capsule Take 1 Cap by mouth every seven (7) days.  lisinopril (PRINIVIL, ZESTRIL) 10 mg tablet Take 1 Tab by mouth daily. Allergies   Allergen Reactions    Codeine Anaphylaxis    Cranberry Anaphylaxis    Morphine Anaphylaxis    Nsaids (Non-Steroidal Anti-Inflammatory Drug) Other (comments)     Gastric Bypass Surgery in the Past    Strawberry Anaphylaxis       Past Surgical History:   Procedure Laterality Date    HX BREAST REDUCTION Bilateral     HX CERVICAL DISKECTOMY  2019    ACDF C5/6    HX CERVICAL FUSION  2019    ACDF C5/6    HX  SECTION      HX  SECTION      HX CHOLECYSTECTOMY      HX GASTRIC BYPASS      HX GYN  1993    uterus and cervix removed    HX HYSTERECTOMY      HX OOPHORECTOMY      HX ORTHOPAEDIC Right 2000    \"wrist surgically fixed\" per patient    [address]    \"Most of Thyroid removed\" per patient.  HX TONSILLECTOMY  1965    NEUROLOGICAL PROCEDURE UNLISTED       Anterior cervical decompression and fusion, C5-6; structural allograft x1; anterior cervical plate fixation at D7-1 with K2M anterior cervical locking plate.        Past Medical History:   Diagnosis Date    Hypertension     Ill-defined condition 2008    sub arachnoid hemorrhage       Social History     Socioeconomic History    Marital status:      Spouse name: Not on file    Number of children: Not on file    Years of education: Not on file    Highest education level: Not on file   Occupational History    Occupation: [employer]     Employer: OTHER     Comment: at [address]  resource strain: Not on file    Food insecurity:     Worry: Not on file     Inability: Not on file    Transportation needs:     Medical: Not on file     Non-medical: Not on file   Tobacco Use    Smoking status: Light Tobacco Smoker     Packs/day: 0.25    Smokeless tobacco: Never Used   Substance and Sexual Activity    Alcohol use: Not Currently    Drug use: Never    Sexual activity: Not on file   Lifestyle    Physical activity:     Days per week: Not on file     Minutes per session: Not on file    Stress: Not on file   Relationships    Social connections:     Talks on phone: Not on file     Gets together: Not on file     Attends Sikhism service: Not on file     Active member of club or organization: Not on file     Attends meetings of clubs or organizations: Not on file     Relationship status: Not on file    Intimate partner violence:     Fear of current or ex partner: Not on file     Emotionally abused: Not on file     Physically abused: Not on file     Forced sexual activity: Not on file   Other Topics Concern     Service Not Asked    Blood Transfusions Not Asked    Caffeine Concern Not Asked    Occupational Exposure Not Asked   Geoffery Fillers Hazards Not Asked    Sleep Concern Not Asked    Stress Concern Not Asked    Weight Concern Not Asked    Special Diet Not Asked    Back Care Not Asked    Exercise Not Asked    Bike Helmet Not Asked   [address] Not Asked    Self-Exams Not Asked   Social History Narrative    Not on file         REVIEW OF SYSTEMS:   CONSTITUTIONAL SYMPTOMS:  Negative. EYES:  Negative. EARS, NOSE, THROAT AND MOUTH:  Negative. CARDIOVASCULAR:  Negative.    RESPIRATORY: Negative. GENITOURINARY: Per HPI. GASTROINTESTINAL:  Per HPI. INTEGUMENTARY (SKIN AND/OR BREAST):  Negative. MUSCULOSKELETAL: Per HPI.   ENDOCRINE/RHEUMATOLOGIC:  Negative. NEUROLOGICAL:  Per HPI. HEMATOLOGIC/LYMPHATIC:  Negative. ALLERGIC/IMMUNOLOGIC:  Negative. PSYCHIATRIC:  Negative. PHYSICAL EXAMINATION:   Visit Vitals  /88 (BP 1 Location: Right arm, BP Patient Position: Sitting)   Pulse 87   Temp 98.1 °F (36.7 °C) (Oral)   Resp 20   Wt 235 lb 6.4 oz (106.8 kg)   SpO2 99%   BMI 33.78 kg/m²    PAIN SCALE: 1/10    CONSTITUTIONAL: The patient is in no apparent distress and is alert and oriented x 3. HEENT: Normocephalic. Hearing grossly intact. NECK: Supple and symmetric. no tenderness, or masses were felt. RESPIRATORY: No labored breathing. CARDIOVASCULAR: The carotid pulses were normal. Peripheral pulses were 2+. CHEST: Normal AP diameter and normal contour without any kyphoscoliosis. LYMPHATIC: No lymphadenopathy was appreciated in the neck, axillae or groin. SKIN:  Incision healing well, no drainage, no erythema, no hernia, no seroma, no swelling, no dehiscence, incision well approximated. Negative for scars, rashes, lesions, or ulcers on the right upper, right lower, left upper, left lower and trunk. NEUROLOGICAL: Alert and oriented x 3. Ambulation without assistive device. FWB. EXTREMITIES: See musculoskeletal.  MUSCULOSKELETAL:   Head and Neck: Mild neck pain. Negative for misalignment, asymmetry, crepitation, defects, tenderness masses or effusions.  Left Upper Extremity: Inspection, percussion and palpation performed. Nixons sign is negative.  Right Upper Extremity: Inspection, percussion and palpation performed. Nixons sign is negative.  Spine, Ribs and Pelvis: Inspection, percussion and palpation performed. Negative for misalignment, asymmetry, crepitation, defects, tenderness masses or effusions.     Left Lower Extremity: Inspection, percussion and palpation performed. Negative straight leg raise.  Right Lower Extremity: Inspection, percussion and palpation performed. Negative straight leg raise. SPINE EXAM:     Cervical spine: Neck is midline. Normal muscle tone. No focal atrophy is noted. ASSESSMENT    ICD-10-CM ICD-9-CM    1. S/P cervical spinal fusion Z98.1 V45.4 AMB POC XRAY, SPINE, CERVICAL; 2 OR 3   2. HNP (herniated nucleus pulposus), cervical M50.20 722.0 AMB POC XRAY, SPINE, CERVICAL; 2 OR 3       Written by Farhat Turner, as dictated by Gail Carver MD.    I, Dr. Gail Carver MD, confirm that all documentation is accurate.

## 2019-11-19 ENCOUNTER — OFFICE VISIT (OUTPATIENT)
Dept: ORTHOPEDIC SURGERY | Age: 59
End: 2019-11-19

## 2019-11-19 DIAGNOSIS — M54.12 RIGHT CERVICAL RADICULOPATHY: ICD-10-CM

## 2019-11-19 DIAGNOSIS — M50.20 HNP (HERNIATED NUCLEUS PULPOSUS), CERVICAL: ICD-10-CM

## 2019-11-19 DIAGNOSIS — Z98.1 S/P CERVICAL SPINAL FUSION: Primary | ICD-10-CM

## 2020-02-18 ENCOUNTER — OFFICE VISIT (OUTPATIENT)
Dept: ORTHOPEDIC SURGERY | Age: 60
End: 2020-02-18

## 2020-02-18 VITALS
HEART RATE: 86 BPM | TEMPERATURE: 98 F | OXYGEN SATURATION: 98 % | WEIGHT: 235 LBS | HEIGHT: 70 IN | DIASTOLIC BLOOD PRESSURE: 78 MMHG | RESPIRATION RATE: 16 BRPM | SYSTOLIC BLOOD PRESSURE: 106 MMHG | BODY MASS INDEX: 33.64 KG/M2

## 2020-02-18 DIAGNOSIS — M54.12 RIGHT CERVICAL RADICULOPATHY: ICD-10-CM

## 2020-02-18 DIAGNOSIS — Z98.1 S/P CERVICAL SPINAL FUSION: Primary | ICD-10-CM

## 2020-02-18 DIAGNOSIS — M62.838 MUSCLE SPASM: ICD-10-CM

## 2020-02-18 DIAGNOSIS — M50.20 HNP (HERNIATED NUCLEUS PULPOSUS), CERVICAL: ICD-10-CM

## 2020-02-18 RX ORDER — METHOCARBAMOL 500 MG/1
TABLET, FILM COATED ORAL
Qty: 90 TAB | Refills: 0 | Status: SHIPPED | OUTPATIENT
Start: 2020-02-18 | End: 2021-07-02

## 2020-02-18 NOTE — PROGRESS NOTES
Chief complaint/History of Present Illness:  Chief Complaint   Patient presents with    Follow-up     neck surgery     HPI  Magali Medrano is a  63 y.o.  female      HISTORY OF PRESENT ILLNESS:  Ms. Uma Valdez comes in today six months status post her ACDF at C5-6 done August 21, 2019. She states she is doing very well. Her right arm pain has totally resolved. She will get a muscle spasm in the right subscapular area from time to time if she is doing a lot of work, but overall, she is doing well. She will take a Robaxin 500 mg, and that seems to help. She is only taking it maybe twice a week. She states she now has full function of that right arm where she had weakness in it before and has absolutely no nerve pain. She denies fever and bowel and bladder dysfunction. She works at [employer] in [place]. She only occasionally smokes a cigarette about once a month. Her blood pressure still remains well controlled. Today, it is 106/78. For some reason, her blood pressure went down, and she was able to get off her Lisinopril after the surgery and has not had to restart it. PHYSICAL EXAM:  Ms. Uma Valdez is a 52-year-old female. She is alert and oriented. She has a normal mood and affect. She has a full weightbearing, non-antalgic gait using no assistive device. She has 4/5 strength of the bilateral upper extremities, and positive Nixons bilaterally. She has a normal tandem gait. ASSESSMENT/PLAN:  This is a patient six months out from her ACDF at C5-6. She is doing well. She is very happy with the outcome of the surgery. I did give her one refill of Robaxin for her occasional muscle spasm. She is off the Neurontin. She has not taken it in quite some time. We will see her back in six months or sooner if needed. At that time, we will get one last x-ray.         Review of systems:    Past Medical History:   Diagnosis Date    Hypertension     Ill-defined condition 2008    sub arachnoid hemorrhage     Past Surgical History:   Procedure Laterality Date    HX BREAST REDUCTION Bilateral 1994    HX CERVICAL DISKECTOMY  2019    ACDF C5/6    HX CERVICAL FUSION  2019    ACDF C5/6    HX  SECTION      HX  SECTION      HX CHOLECYSTECTOMY      HX GASTRIC BYPASS  2002    HX GYN  1993    uterus and cervix removed    HX HYSTERECTOMY      HX OOPHORECTOMY  2006    HX ORTHOPAEDIC Right 2000    \"wrist surgically fixed\" per patient    [address]    \"Most of Thyroid removed\" per patient.  HX TONSILLECTOMY  1965    NEUROLOGICAL PROCEDURE UNLISTED       Anterior cervical decompression and fusion, C5-6; structural allograft x1; anterior cervical plate fixation at D3-0 with K2M anterior cervical locking plate.      Social History     Socioeconomic History    Marital status:      Spouse name: Not on file    Number of children: Not on file    Years of education: Not on file    Highest education level: Not on file   Occupational History    Occupation: [employer]     Employer: OTHER     Comment: at [address] [facility] resource strain: Not on file    Food insecurity:     Worry: Not on file     Inability: Not on file    Transportation needs:     Medical: Not on file     Non-medical: Not on file   Tobacco Use    Smoking status: Light Tobacco Smoker     Packs/day: 0.25    Smokeless tobacco: Never Used   Substance and Sexual Activity    Alcohol use: Not Currently    Drug use: Never    Sexual activity: Not on file   Lifestyle    Physical activity:     Days per week: Not on file     Minutes per session: Not on file    Stress: Not on file   Relationships    Social connections:     Talks on phone: Not on file     Gets together: Not on file     Attends U[employer] service: Not on file     Active member of club or organization: Not on file     Attends meetings of clubs or organizations: Not on file     Relationship status: Not on file    Intimate partner violence:     Fear of current or ex partner: Not on file     Emotionally abused: Not on file     Physically abused: Not on file     Forced sexual activity: Not on file   Other Topics Concern     Service Not Asked    Blood Transfusions Not Asked    Caffeine Concern Not Asked    Occupational Exposure Not Asked    Hobby Hazards Not Asked    Sleep Concern Not Asked    Stress Concern Not Asked    Weight Concern Not Asked    Special Diet Not Asked    Back Care Not Asked    Exercise Not Asked    Bike Helmet Not Asked   [address] Not Asked    Self-Exams Not Asked   Social History Narrative    Not on file     Family History   Problem Relation Age of Onset    Osteoporosis Mother     Heart Disease Mother     Arrhythmia Mother     Hypertension Mother     Thyroid Disease Father     Stroke Father     Other Father         Rheumatic Fever    Hypertension Father        Physical Exam:  Visit Vitals  /78 (BP 1 Location: Left arm, BP Patient Position: Sitting)   Pulse 86   Temp 98 °F (36.7 °C) (Oral)   Resp 16   Ht 5' 10\" (1.778 m)   Wt 235 lb (106.6 kg)   SpO2 98% Comment: RA   BMI 33.72 kg/m²     Pain Scale: 0 - No pain/10       has been . reviewed and is appropriate          Diagnoses and all orders for this visit:    1. S/P cervical spinal fusion    2. HNP (herniated nucleus pulposus), cervical    3. Right cervical radiculopathy    4. Muscle spasm  -     methocarbamol (ROBAXIN) 500 mg tablet; Take 1 tab by mouth BID-TID as needed for muscle spasms  Indications: muscle spasm            Follow-up and Dispositions    · Return in about 6 months (around 8/18/2020) for with NP.              We have informed Sharita Ser to notify us for immediate appointment if she has any worsening neurogical symptoms or if an emergency situation presents, then call [phone]

## 2020-02-18 NOTE — PROGRESS NOTES
Scott Cespedes presents today for   Chief Complaint   Patient presents with    Follow-up     neck surgery       Is someone accompanying this pt? no    Is the patient using any DME equipment during OV? no    Depression Screening:  3 most recent PHQ Screens 10/4/2019   Little interest or pleasure in doing things Not at all   Feeling down, depressed, irritable, or hopeless Not at all   Total Score PHQ 2 0       Learning Assessment:  Learning Assessment 10/4/2019   PRIMARY LEARNER Patient   PRIMARY LANGUAGE ENGLISH   LEARNER PREFERENCE PRIMARY LISTENING   ANSWERED BY Patient   RELATIONSHIP SELF       Abuse Screening:  Abuse Screening Questionnaire 10/4/2019   Do you ever feel afraid of your partner? N   Are you in a relationship with someone who physically or mentally threatens you? N   Is it safe for you to go home? Y       Fall Risk  Fall Risk Assessment, last 12 mths 10/4/2019   Able to walk? Yes   Fall in past 12 months? No       OPIOID RISK TOOL  No flowsheet data found. Coordination of Care:  1. Have you been to the ER, urgent care clinic since your last visit? no  Hospitalized since your last visit? no    2. Have you seen or consulted any other health care providers outside of the [address] [place] [address] since your last visit? no Include any pap smears or colon screening.  no    Last  Checked 2/18/2020

## 2022-03-19 PROBLEM — M50.20 HNP (HERNIATED NUCLEUS PULPOSUS), CERVICAL: Status: ACTIVE | Noted: 2019-08-21

## 2023-01-31 RX ORDER — LISINOPRIL 10 MG/1
1 TABLET ORAL DAILY
COMMUNITY
Start: 2019-06-11

## 2023-01-31 RX ORDER — ERGOCALCIFEROL 1.25 MG/1
1 CAPSULE ORAL
COMMUNITY
Start: 2019-06-29

## 2023-01-31 RX ORDER — ACETAMINOPHEN 500 MG
TABLET ORAL EVERY 6 HOURS PRN
COMMUNITY

## 2023-01-31 RX ORDER — LORATADINE 10 MG/1
1 TABLET ORAL DAILY
COMMUNITY
Start: 2019-06-11

## 2023-01-31 RX ORDER — METRONIDAZOLE 10 MG/G
GEL TOPICAL DAILY
COMMUNITY

## 2024-05-08 ENCOUNTER — NEW PATIENT (OUTPATIENT)
Dept: RURAL CLINIC 1 | Facility: CLINIC | Age: 64
End: 2024-05-08

## 2024-05-08 DIAGNOSIS — H52.4: ICD-10-CM

## 2024-05-08 PROCEDURE — 92015 DETERMINE REFRACTIVE STATE: CPT

## 2024-05-08 PROCEDURE — 92004 COMPRE OPH EXAM NEW PT 1/>: CPT

## 2024-05-08 ASSESSMENT — VISUAL ACUITY
OD_CC: 20/20
OS_CC: 20/20
OU_CC: 20/20

## 2024-05-08 ASSESSMENT — TONOMETRY
OS_IOP_MMHG: 14
OD_IOP_MMHG: 14

## 2025-05-09 ENCOUNTER — COMPREHENSIVE EXAM (OUTPATIENT)
Age: 65
End: 2025-05-09

## 2025-05-09 DIAGNOSIS — H52.4: ICD-10-CM

## 2025-05-09 PROCEDURE — 92015 DETERMINE REFRACTIVE STATE: CPT

## 2025-05-09 PROCEDURE — 92014 COMPRE OPH EXAM EST PT 1/>: CPT

## (undated) DEVICE — WATERPROOF, BACTERIA PROOF DRESSING WITH ABSORBENT SEE THROUGH PAD: Brand: OPSITE POST-OP VISIBLE 10X8CM CTN 20

## (undated) DEVICE — GOWN,REINFORCED,POLY,AURORA,XLARGE,STRL: Brand: MEDLINE

## (undated) DEVICE — 3M™ TEGADERM™ TRANSPARENT FILM DRESSING FRAME STYLE, 1626W, 4 IN X 4-3/4 IN (10 CM X 12 CM), 50/CT 4CT/CASE: Brand: 3M™ TEGADERM™

## (undated) DEVICE — 3M™ BAIR PAWS FLEX™ WARMING GOWN, STANDARD, 20 PER CASE 81003: Brand: BAIR PAWS™

## (undated) DEVICE — SSC BONE WAX: Brand: SSC BONE WAX

## (undated) DEVICE — GARMENT,MEDLINE,DVT,INT,CALF,MED, GEN2: Brand: MEDLINE

## (undated) DEVICE — SCREW EXT FIX L14MM FOR DISTRCTN

## (undated) DEVICE — 10FR FRAZIER SUCTION HANDLE: Brand: CARDINAL HEALTH

## (undated) DEVICE — INTENDED FOR TISSUE SEPARATION, AND OTHER PROCEDURES THAT REQUIRE A SHARP SURGICAL BLADE TO PUNCTURE OR CUT.: Brand: BARD-PARKER SAFETY BLADES SIZE 10, STERILE

## (undated) DEVICE — SOLUTION IV 1000ML 0.9% SOD CHL

## (undated) DEVICE — STOCKING COMPR L L31-34IN LNG 19MMHG ANK 9-10IN CALF

## (undated) DEVICE — PREP CHLORAPREP 10.5 ML ORG --

## (undated) DEVICE — SPIROMETER INCENT 2500ML W ONE W VLV

## (undated) DEVICE — COLLAR CERV L H3.25X23IN M DENS FOAM COT STOCK CVR LO

## (undated) DEVICE — APPLIER CLP AUTO MED 9.75 IN TI SURGCLP SUPER INTLOK 20 DISP

## (undated) DEVICE — FLEX ADVANTAGE 3000CC: Brand: FLEX ADVANTAGE

## (undated) DEVICE — PACKING 8004000 NEURAY 200PK 13X13MM: Brand: NEURAY ®

## (undated) DEVICE — SUTURE VCRL SZ 3-0 L27IN ABSRB UD L26MM SH 1/2 CIR J416H

## (undated) DEVICE — APPLICATOR BNDG 1MM ADH PREMIERPRO EXOFIN

## (undated) DEVICE — 3.0MM PRECISION NEURO (MATCH HEAD)

## (undated) DEVICE — INSULATED BLADE ELECTRODE: Brand: EDGE

## (undated) DEVICE — STERILE POLYISOPRENE POWDER-FREE SURGICAL GLOVES: Brand: PROTEXIS

## (undated) DEVICE — SPONGE DISSECT PNUT SM 3/8IN -- 5/PK

## (undated) DEVICE — DRAIN SURG W7MMXL20CM SIL FULL PERF HUBLESS FLAT RADPQ STRP

## (undated) DEVICE — KIT CLN UP BON SECOURS MARYV

## (undated) DEVICE — REM POLYHESIVE ADULT PATIENT RETURN ELECTRODE: Brand: VALLEYLAB

## (undated) DEVICE — SYRINGE MED 3ML NDL 22GA L1 1/2IN REG BVL SFGLDE

## (undated) DEVICE — SOFT SILICONE HYDROCELLULAR SACRUM DRESSING WITH LOCK AWAY LAYER: Brand: ALLEVYN LIFE SACRUM (LARGE) PACK OF 10

## (undated) DEVICE — Device